# Patient Record
Sex: MALE | Race: WHITE | Employment: OTHER | URBAN - METROPOLITAN AREA
[De-identification: names, ages, dates, MRNs, and addresses within clinical notes are randomized per-mention and may not be internally consistent; named-entity substitution may affect disease eponyms.]

---

## 2017-02-09 ENCOUNTER — HOSPITAL ENCOUNTER (OUTPATIENT)
Dept: SURGERY | Age: 72
Discharge: HOME | End: 2017-02-09
Payer: MEDICARE

## 2017-02-09 ENCOUNTER — HOSPITAL ENCOUNTER (OUTPATIENT)
Dept: GENERAL RADIOLOGY | Age: 72
Discharge: HOME | End: 2017-02-09
Attending: UROLOGY
Payer: MEDICARE

## 2017-02-09 VITALS
WEIGHT: 170 LBS | DIASTOLIC BLOOD PRESSURE: 78 MMHG | HEIGHT: 66 IN | SYSTOLIC BLOOD PRESSURE: 159 MMHG | TEMPERATURE: 97.9 F | OXYGEN SATURATION: 92 % | BODY MASS INDEX: 27.32 KG/M2 | HEART RATE: 92 BPM | RESPIRATION RATE: 18 BRPM

## 2017-02-09 LAB
ANION GAP BLD CALC-SCNC: 8 MMOL/L (ref 7–16)
ATRIAL RATE: 68 BPM
BUN SERPL-MCNC: 13 MG/DL (ref 8–23)
CALCIUM SERPL-MCNC: 8.6 MG/DL (ref 8.3–10.4)
CALCULATED P AXIS, ECG09: 83 DEGREES
CALCULATED R AXIS, ECG10: 57 DEGREES
CALCULATED T AXIS, ECG11: 67 DEGREES
CHLORIDE SERPL-SCNC: 103 MMOL/L (ref 98–107)
CO2 SERPL-SCNC: 32 MMOL/L (ref 21–32)
CREAT SERPL-MCNC: 1.12 MG/DL (ref 0.8–1.5)
DIAGNOSIS, 93000: NORMAL
DIASTOLIC BP, ECG02: NORMAL MMHG
ERYTHROCYTE [DISTWIDTH] IN BLOOD BY AUTOMATED COUNT: 14.3 % (ref 11.9–14.6)
GLUCOSE SERPL-MCNC: 99 MG/DL (ref 65–100)
HCT VFR BLD AUTO: 43.1 % (ref 41.1–50.3)
HGB BLD-MCNC: 15 G/DL (ref 13.6–17.2)
MCH RBC QN AUTO: 31.3 PG (ref 26.1–32.9)
MCHC RBC AUTO-ENTMCNC: 34.8 G/DL (ref 31.4–35)
MCV RBC AUTO: 89.8 FL (ref 79.6–97.8)
P-R INTERVAL, ECG05: 118 MS
PLATELET # BLD AUTO: 137 K/UL (ref 150–450)
PMV BLD AUTO: 11.4 FL (ref 10.8–14.1)
POTASSIUM SERPL-SCNC: 4 MMOL/L (ref 3.5–5.1)
Q-T INTERVAL, ECG07: 404 MS
QRS DURATION, ECG06: 76 MS
QTC CALCULATION (BEZET), ECG08: 429 MS
RBC # BLD AUTO: 4.8 M/UL (ref 4.23–5.67)
SODIUM SERPL-SCNC: 143 MMOL/L (ref 136–145)
SYSTOLIC BP, ECG01: NORMAL MMHG
VENTRICULAR RATE, ECG03: 68 BPM
WBC # BLD AUTO: 6.4 K/UL (ref 4.3–11.1)

## 2017-02-09 PROCEDURE — 93005 ELECTROCARDIOGRAM TRACING: CPT | Performed by: UROLOGY

## 2017-02-09 PROCEDURE — 80048 BASIC METABOLIC PNL TOTAL CA: CPT | Performed by: UROLOGY

## 2017-02-09 PROCEDURE — 71020 XR CHEST PA LAT: CPT

## 2017-02-09 PROCEDURE — 85027 COMPLETE CBC AUTOMATED: CPT | Performed by: UROLOGY

## 2017-02-09 RX ORDER — TAMSULOSIN HYDROCHLORIDE 0.4 MG/1
0.4 CAPSULE ORAL
COMMUNITY
End: 2017-02-17

## 2017-02-09 RX ORDER — MEMANTINE HYDROCHLORIDE 10 MG/1
10 TABLET ORAL 2 TIMES DAILY
COMMUNITY

## 2017-02-09 RX ORDER — TRAZODONE HYDROCHLORIDE 50 MG/1
TABLET ORAL
COMMUNITY

## 2017-02-09 RX ORDER — SERTRALINE HYDROCHLORIDE 25 MG/1
25 TABLET, FILM COATED ORAL DAILY
COMMUNITY

## 2017-02-09 RX ORDER — ASPIRIN 81 MG/1
81 TABLET ORAL
COMMUNITY
End: 2017-02-17

## 2017-02-09 RX ORDER — DIVALPROEX SODIUM 250 MG/1
500 TABLET, DELAYED RELEASE ORAL 2 TIMES DAILY
COMMUNITY

## 2017-02-09 NOTE — PERIOP NOTES
Patient verified name, , and surgery as listed in [facility]. TYPE  CASE:2  Orders per surgeon: cbc,bmp,chest-xray Received  Labs per surgeon:yes: results -  Labs per anesthesia protocol: type and screen- dos : results -  EKG  :  yes      Patient provided with handouts including guide to surgery , transfusions, pain management and hand hygiene for the family and community. Pt verbalizes understanding of all pre-op instructions . Instructed that family must be present in building at all times. Hibiclens and instructions given per hospital policy. Instructed patient to continue  previous medications as prescribed prior to surgery and  to take the following medications the day of surgery according to anesthesia guidelines : depakote,namenda       Original medication prescription bottles some visualized during patient appointment. Continue all previous medications unless otherwise directed. Instructed patient to hold  the following medications prior to surgery: aspirin      Patient verbalized understanding of all instructions and provided all medical/health information to the best of their ability.

## 2017-02-09 NOTE — PERIOP NOTES
Dr. Donna Barr informed of pt period of confusion after general anesthesia, also implanted cardiac monitor- ok to proceed with surgery

## 2017-02-13 ENCOUNTER — ANESTHESIA EVENT (OUTPATIENT)
Dept: SURGERY | Age: 72
DRG: 709 | End: 2017-02-13
Payer: MEDICARE

## 2017-02-13 RX ORDER — SODIUM CHLORIDE 9 MG/ML
25 INJECTION, SOLUTION INTRAVENOUS CONTINUOUS
Status: CANCELLED | OUTPATIENT
Start: 2017-02-13 | End: 2017-02-14

## 2017-02-14 ENCOUNTER — ANESTHESIA (OUTPATIENT)
Dept: SURGERY | Age: 72
DRG: 709 | End: 2017-02-14
Payer: MEDICARE

## 2017-02-14 ENCOUNTER — SURGERY (OUTPATIENT)
Age: 72
End: 2017-02-14

## 2017-02-14 ENCOUNTER — HOSPITAL ENCOUNTER (INPATIENT)
Age: 72
LOS: 1 days | Discharge: HOME | DRG: 709 | End: 2017-02-17
Attending: UROLOGY | Admitting: UROLOGY
Payer: MEDICARE

## 2017-02-14 DIAGNOSIS — Z90.79 S/P TURP: ICD-10-CM

## 2017-02-14 DIAGNOSIS — R91.1 PULMONARY NODULE: Primary | ICD-10-CM

## 2017-02-14 DIAGNOSIS — R31.9 HEMATURIA: ICD-10-CM

## 2017-02-14 DIAGNOSIS — R09.02 HYPOXIA: ICD-10-CM

## 2017-02-14 DIAGNOSIS — J43.9 PULMONARY EMPHYSEMA, UNSPECIFIED EMPHYSEMA TYPE (HCC): Chronic | ICD-10-CM

## 2017-02-14 DIAGNOSIS — N13.9 OBSTRUCTIVE UROPATHY: ICD-10-CM

## 2017-02-14 LAB
ABO + RH BLD: NORMAL
BLOOD GROUP ANTIBODIES SERPL: NORMAL
SPECIMEN EXP DATE BLD: NORMAL

## 2017-02-14 PROCEDURE — 77030031139 HC SUT VCRL2 J&J -A: Performed by: UROLOGY

## 2017-02-14 PROCEDURE — 77030032490 HC SLV COMPR SCD KNE COVD -B

## 2017-02-14 PROCEDURE — 74011250636 HC RX REV CODE- 250/636

## 2017-02-14 PROCEDURE — 74011000258 HC RX REV CODE- 258: Performed by: UROLOGY

## 2017-02-14 PROCEDURE — 77030005546 HC CATH URETH FOL 3W BARD -A: Performed by: UROLOGY

## 2017-02-14 PROCEDURE — 77030032490 HC SLV COMPR SCD KNE COVD -B: Performed by: UROLOGY

## 2017-02-14 PROCEDURE — 74011250636 HC RX REV CODE- 250/636: Performed by: UROLOGY

## 2017-02-14 PROCEDURE — 88344 IMHCHEM/IMCYTCHM EA MLT ANTB: CPT

## 2017-02-14 PROCEDURE — 74011250637 HC RX REV CODE- 250/637: Performed by: UROLOGY

## 2017-02-14 PROCEDURE — 77030019927 HC TBNG IRR CYSTO BAXT -A: Performed by: UROLOGY

## 2017-02-14 PROCEDURE — 77030018836 HC SOL IRR NACL ICUM -A

## 2017-02-14 PROCEDURE — 77030018836 HC SOL IRR NACL ICUM -A: Performed by: UROLOGY

## 2017-02-14 PROCEDURE — 0TND8ZZ RELEASE URETHRA, VIA NATURAL OR ARTIFICIAL OPENING ENDOSCOPIC: ICD-10-PCS | Performed by: UROLOGY

## 2017-02-14 PROCEDURE — 77030002888 HC SUT CHRMC J&J -A: Performed by: UROLOGY

## 2017-02-14 PROCEDURE — 73060999999 HC MISC LAB CHARGE

## 2017-02-14 PROCEDURE — 74011000250 HC RX REV CODE- 250

## 2017-02-14 PROCEDURE — 88305 TISSUE EXAM BY PATHOLOGIST: CPT | Performed by: UROLOGY

## 2017-02-14 PROCEDURE — 86900 BLOOD TYPING SEROLOGIC ABO: CPT | Performed by: ANESTHESIOLOGY

## 2017-02-14 PROCEDURE — 76060000037 HC ANESTHESIA 3 TO 3.5 HR: Performed by: UROLOGY

## 2017-02-14 PROCEDURE — 76210000001 HC OR PH I REC 2.5 TO 3 HR: Performed by: UROLOGY

## 2017-02-14 PROCEDURE — 74011250637 HC RX REV CODE- 250/637: Performed by: ANESTHESIOLOGY

## 2017-02-14 PROCEDURE — 0VT08ZZ RESECTION OF PROSTATE, VIA NATURAL OR ARTIFICIAL OPENING ENDOSCOPIC: ICD-10-PCS | Performed by: UROLOGY

## 2017-02-14 PROCEDURE — 77030010545: Performed by: UROLOGY

## 2017-02-14 PROCEDURE — 77030020143 HC AIRWY LARYN INTUB CGAS -A: Performed by: ANESTHESIOLOGY

## 2017-02-14 PROCEDURE — 74011250636 HC RX REV CODE- 250/636: Performed by: ANESTHESIOLOGY

## 2017-02-14 PROCEDURE — 76010000133 HC OR TIME 3 TO 3.5 HR: Performed by: UROLOGY

## 2017-02-14 PROCEDURE — 77030029290 HC ELECTRD LP CUT OCOA -F: Performed by: UROLOGY

## 2017-02-14 PROCEDURE — 77030019940 HC BLNKT HYPOTHRM STRY -B: Performed by: ANESTHESIOLOGY

## 2017-02-14 RX ORDER — MIDAZOLAM HYDROCHLORIDE 1 MG/ML
2 INJECTION, SOLUTION INTRAMUSCULAR; INTRAVENOUS
Status: DISCONTINUED | OUTPATIENT
Start: 2017-02-14 | End: 2017-02-14 | Stop reason: HOSPADM

## 2017-02-14 RX ORDER — ATROPA BELLADONNA AND OPIUM 16.2; 6 MG/1; MG/1
SUPPOSITORY RECTAL AS NEEDED
Status: DISCONTINUED | OUTPATIENT
Start: 2017-02-14 | End: 2017-02-14 | Stop reason: HOSPADM

## 2017-02-14 RX ORDER — SODIUM CHLORIDE 0.9 % (FLUSH) 0.9 %
5-10 SYRINGE (ML) INJECTION AS NEEDED
Status: DISCONTINUED | OUTPATIENT
Start: 2017-02-14 | End: 2017-02-15

## 2017-02-14 RX ORDER — SODIUM CHLORIDE 0.9 % (FLUSH) 0.9 %
5-10 SYRINGE (ML) INJECTION AS NEEDED
Status: DISCONTINUED | OUTPATIENT
Start: 2017-02-14 | End: 2017-02-17 | Stop reason: HOSPADM

## 2017-02-14 RX ORDER — PROPOFOL 10 MG/ML
INJECTION, EMULSION INTRAVENOUS
Status: DISCONTINUED | OUTPATIENT
Start: 2017-02-14 | End: 2017-02-14 | Stop reason: HOSPADM

## 2017-02-14 RX ORDER — PROPOFOL 10 MG/ML
INJECTION, EMULSION INTRAVENOUS AS NEEDED
Status: DISCONTINUED | OUTPATIENT
Start: 2017-02-14 | End: 2017-02-14 | Stop reason: HOSPADM

## 2017-02-14 RX ORDER — LIDOCAINE HYDROCHLORIDE 20 MG/ML
INJECTION, SOLUTION EPIDURAL; INFILTRATION; INTRACAUDAL; PERINEURAL AS NEEDED
Status: DISCONTINUED | OUTPATIENT
Start: 2017-02-14 | End: 2017-02-14 | Stop reason: HOSPADM

## 2017-02-14 RX ORDER — SODIUM CHLORIDE 0.9 % (FLUSH) 0.9 %
5-10 SYRINGE (ML) INJECTION AS NEEDED
Status: DISCONTINUED | OUTPATIENT
Start: 2017-02-14 | End: 2017-02-14 | Stop reason: HOSPADM

## 2017-02-14 RX ORDER — HYDROCODONE BITARTRATE AND ACETAMINOPHEN 7.5; 325 MG/1; MG/1
1 TABLET ORAL AS NEEDED
Status: DISCONTINUED | OUTPATIENT
Start: 2017-02-14 | End: 2017-02-17 | Stop reason: HOSPADM

## 2017-02-14 RX ORDER — DIVALPROEX SODIUM 125 MG/1
250 TABLET, DELAYED RELEASE ORAL 2 TIMES DAILY
Status: DISCONTINUED | OUTPATIENT
Start: 2017-02-14 | End: 2017-02-17 | Stop reason: HOSPADM

## 2017-02-14 RX ORDER — SODIUM CHLORIDE 0.9 % (FLUSH) 0.9 %
5-10 SYRINGE (ML) INJECTION EVERY 8 HOURS
Status: DISCONTINUED | OUTPATIENT
Start: 2017-02-14 | End: 2017-02-14 | Stop reason: HOSPADM

## 2017-02-14 RX ORDER — SODIUM CHLORIDE 0.9 % (FLUSH) 0.9 %
5-10 SYRINGE (ML) INJECTION EVERY 8 HOURS
Status: DISCONTINUED | OUTPATIENT
Start: 2017-02-14 | End: 2017-02-17 | Stop reason: HOSPADM

## 2017-02-14 RX ORDER — TRAZODONE HYDROCHLORIDE 50 MG/1
50 TABLET ORAL
Status: DISCONTINUED | OUTPATIENT
Start: 2017-02-14 | End: 2017-02-17 | Stop reason: HOSPADM

## 2017-02-14 RX ORDER — PHENAZOPYRIDINE HYDROCHLORIDE 200 MG/1
200 TABLET, FILM COATED ORAL ONCE
Status: COMPLETED | OUTPATIENT
Start: 2017-02-14 | End: 2017-02-14

## 2017-02-14 RX ORDER — SODIUM CHLORIDE, SODIUM LACTATE, POTASSIUM CHLORIDE, CALCIUM CHLORIDE 600; 310; 30; 20 MG/100ML; MG/100ML; MG/100ML; MG/100ML
100 INJECTION, SOLUTION INTRAVENOUS CONTINUOUS
Status: DISCONTINUED | OUTPATIENT
Start: 2017-02-14 | End: 2017-02-14 | Stop reason: HOSPADM

## 2017-02-14 RX ORDER — LIDOCAINE HYDROCHLORIDE 10 MG/ML
0.1 INJECTION INFILTRATION; PERINEURAL AS NEEDED
Status: DISCONTINUED | OUTPATIENT
Start: 2017-02-14 | End: 2017-02-14 | Stop reason: HOSPADM

## 2017-02-14 RX ORDER — FENTANYL CITRATE 50 UG/ML
INJECTION, SOLUTION INTRAMUSCULAR; INTRAVENOUS AS NEEDED
Status: DISCONTINUED | OUTPATIENT
Start: 2017-02-14 | End: 2017-02-14 | Stop reason: HOSPADM

## 2017-02-14 RX ORDER — NALOXONE HYDROCHLORIDE 0.4 MG/ML
0.1 INJECTION, SOLUTION INTRAMUSCULAR; INTRAVENOUS; SUBCUTANEOUS AS NEEDED
Status: DISCONTINUED | OUTPATIENT
Start: 2017-02-14 | End: 2017-02-17 | Stop reason: HOSPADM

## 2017-02-14 RX ORDER — SODIUM CHLORIDE 9 MG/ML
75 INJECTION, SOLUTION INTRAVENOUS CONTINUOUS
Status: DISCONTINUED | OUTPATIENT
Start: 2017-02-14 | End: 2017-02-17

## 2017-02-14 RX ORDER — ATROPA BELLADONNA AND OPIUM 16.2; 6 MG/1; MG/1
1 SUPPOSITORY RECTAL
Status: DISCONTINUED | OUTPATIENT
Start: 2017-02-14 | End: 2017-02-17 | Stop reason: HOSPADM

## 2017-02-14 RX ORDER — HYDROMORPHONE HYDROCHLORIDE 2 MG/ML
0.5 INJECTION, SOLUTION INTRAMUSCULAR; INTRAVENOUS; SUBCUTANEOUS
Status: DISCONTINUED | OUTPATIENT
Start: 2017-02-14 | End: 2017-02-15 | Stop reason: HOSPADM

## 2017-02-14 RX ORDER — SERTRALINE HYDROCHLORIDE 50 MG/1
25 TABLET, FILM COATED ORAL DAILY
Status: DISCONTINUED | OUTPATIENT
Start: 2017-02-15 | End: 2017-02-17 | Stop reason: HOSPADM

## 2017-02-14 RX ORDER — MEMANTINE HYDROCHLORIDE 5 MG/1
10 TABLET ORAL 2 TIMES DAILY
Status: DISCONTINUED | OUTPATIENT
Start: 2017-02-14 | End: 2017-02-17 | Stop reason: HOSPADM

## 2017-02-14 RX ORDER — FAMOTIDINE 20 MG/1
20 TABLET, FILM COATED ORAL ONCE
Status: COMPLETED | OUTPATIENT
Start: 2017-02-14 | End: 2017-02-14

## 2017-02-14 RX ORDER — DEXAMETHASONE SODIUM PHOSPHATE 4 MG/ML
INJECTION, SOLUTION INTRA-ARTICULAR; INTRALESIONAL; INTRAMUSCULAR; INTRAVENOUS; SOFT TISSUE AS NEEDED
Status: DISCONTINUED | OUTPATIENT
Start: 2017-02-14 | End: 2017-02-14 | Stop reason: HOSPADM

## 2017-02-14 RX ORDER — ONDANSETRON 2 MG/ML
INJECTION INTRAMUSCULAR; INTRAVENOUS AS NEEDED
Status: DISCONTINUED | OUTPATIENT
Start: 2017-02-14 | End: 2017-02-14 | Stop reason: HOSPADM

## 2017-02-14 RX ORDER — FENTANYL CITRATE 50 UG/ML
100 INJECTION, SOLUTION INTRAMUSCULAR; INTRAVENOUS ONCE
Status: DISCONTINUED | OUTPATIENT
Start: 2017-02-14 | End: 2017-02-14 | Stop reason: HOSPADM

## 2017-02-14 RX ORDER — OXYCODONE HYDROCHLORIDE 5 MG/1
5 TABLET ORAL
Status: DISCONTINUED | OUTPATIENT
Start: 2017-02-14 | End: 2017-02-17 | Stop reason: HOSPADM

## 2017-02-14 RX ADMIN — ATROPA BELLADONNA AND OPIUM 1 SUPPOSITORY: 16.2; 6 SUPPOSITORY RECTAL at 13:06

## 2017-02-14 RX ADMIN — FENTANYL CITRATE 50 MCG: 50 INJECTION, SOLUTION INTRAMUSCULAR; INTRAVENOUS at 10:26

## 2017-02-14 RX ADMIN — FENTANYL CITRATE 50 MCG: 50 INJECTION, SOLUTION INTRAMUSCULAR; INTRAVENOUS at 11:08

## 2017-02-14 RX ADMIN — CEFTRIAXONE 2 G: 2 INJECTION, POWDER, FOR SOLUTION INTRAMUSCULAR; INTRAVENOUS at 08:45

## 2017-02-14 RX ADMIN — SODIUM CHLORIDE, SODIUM LACTATE, POTASSIUM CHLORIDE, AND CALCIUM CHLORIDE 100 ML/HR: 600; 310; 30; 20 INJECTION, SOLUTION INTRAVENOUS at 09:00

## 2017-02-14 RX ADMIN — DIVALPROEX SODIUM 250 MG: 125 TABLET, DELAYED RELEASE ORAL at 17:02

## 2017-02-14 RX ADMIN — SODIUM CHLORIDE 75 ML/HR: 900 INJECTION, SOLUTION INTRAVENOUS at 17:07

## 2017-02-14 RX ADMIN — HYDROCODONE BITARTRATE AND ACETAMINOPHEN 1 TABLET: 7.5; 325 TABLET ORAL at 17:01

## 2017-02-14 RX ADMIN — PROPOFOL 10 MG: 10 INJECTION, EMULSION INTRAVENOUS at 12:36

## 2017-02-14 RX ADMIN — PROPOFOL 200 MCG/KG/MIN: 10 INJECTION, EMULSION INTRAVENOUS at 10:20

## 2017-02-14 RX ADMIN — DEXAMETHASONE SODIUM PHOSPHATE 4 MG: 4 INJECTION, SOLUTION INTRA-ARTICULAR; INTRALESIONAL; INTRAMUSCULAR; INTRAVENOUS; SOFT TISSUE at 10:59

## 2017-02-14 RX ADMIN — PHENAZOPYRIDINE 200 MG: 200 TABLET ORAL at 14:29

## 2017-02-14 RX ADMIN — Medication 10 ML: at 22:00

## 2017-02-14 RX ADMIN — FAMOTIDINE 20 MG: 20 TABLET, FILM COATED ORAL at 09:00

## 2017-02-14 RX ADMIN — GENTAMICIN SULFATE 160 MG: 40 INJECTION, SOLUTION INTRAMUSCULAR; INTRAVENOUS at 10:13

## 2017-02-14 RX ADMIN — PROPOFOL 150 MG: 10 INJECTION, EMULSION INTRAVENOUS at 10:18

## 2017-02-14 RX ADMIN — MEMANTINE HYDROCHLORIDE 10 MG: 5 TABLET ORAL at 17:02

## 2017-02-14 RX ADMIN — PROPOFOL 10 MG: 10 INJECTION, EMULSION INTRAVENOUS at 13:01

## 2017-02-14 RX ADMIN — SODIUM CHLORIDE, SODIUM LACTATE, POTASSIUM CHLORIDE, AND CALCIUM CHLORIDE: 600; 310; 30; 20 INJECTION, SOLUTION INTRAVENOUS at 13:14

## 2017-02-14 RX ADMIN — ONDANSETRON 4 MG: 2 INJECTION INTRAMUSCULAR; INTRAVENOUS at 10:59

## 2017-02-14 RX ADMIN — LIDOCAINE HYDROCHLORIDE 60 MG: 20 INJECTION, SOLUTION EPIDURAL; INFILTRATION; INTRACAUDAL; PERINEURAL at 10:18

## 2017-02-14 RX ADMIN — Medication 10 ML: at 17:00

## 2017-02-14 NOTE — ANESTHESIA PREPROCEDURE EVALUATION
Anesthetic History          Comments: Postoperative confusion     Review of Systems / Medical History  Patient summary reviewed    Pulmonary    COPD: mild      Smoker (Former)         Neuro/Psych         TIA     Cardiovascular                  Exercise tolerance: >4 METS     GI/Hepatic/Renal                Endo/Other             Other Findings              Physical Exam    Airway  Mallampati: II  TM Distance: > 6 cm  Neck ROM: normal range of motion   Mouth opening: Normal     Cardiovascular  Regular rate and rhythm,  S1 and S2 normal,  no murmur, click, rub, or gallop             Dental  No notable dental hx       Pulmonary  Breath sounds clear to auscultation               Abdominal         Other Findings            Anesthetic Plan    ASA: 3  Anesthesia type: general            Anesthetic plan and risks discussed with: Patient

## 2017-02-14 NOTE — PERIOP NOTES
Pt BP 87/45. Dr Chris Lofton at bedside. New orders received for LR bolus. Reassess at 1523 /47 after 300ml LR.

## 2017-02-14 NOTE — ANESTHESIA POSTPROCEDURE EVALUATION
Post-Anesthesia Evaluation and Assessment    Patient: Srinivas Colón MRN: [id number]  SSN: [id number]    YOB: 1945  Age: 67 y.o. Sex: male       Cardiovascular Function/Vital Signs  Visit Vitals    /50    Pulse 72    Temp 36.4 °C (97.6 °F)    Resp 12    SpO2 99%       Patient is status post general anesthesia for Procedure(s):  TRANSURETHRAL RESECTION OF PROSTATE WITH BIPOLAR, MEATOTOMY, INTERNAL URETHROTOMY. Nausea/Vomiting: None    Postoperative hydration reviewed and adequate. Pain:      Managed    Neurological Status:   Neuro (WDL): Exceptions to WDL (02/14/17 1333)   At baseline    Mental Status and Level of Consciousness: Arousable    Pulmonary Status:   O2 Device: Oral airway; Oxygen mask (02/14/17 1333)   Adequate oxygenation and airway patent    Complications related to anesthesia: None    Post-anesthesia assessment completed.  No concerns    Signed By: Tomi Lowery MD     February 14, 2017

## 2017-02-14 NOTE — PERIOP NOTES
TRANSFER - OUT REPORT:    Verbal report given to Halley(name) on David Dhillon  being transferred to [facility](unit) for routine post - op       Report consisted of patients Situation, Background, Assessment and   Recommendations(SBAR). Information from the following report(s) SBAR, OR Summary, Procedure Summary and MAR was reviewed with the receiving nurse. Lines:   Peripheral IV 02/14/17 Right Antecubital (Active)   Site Assessment Clean, dry, & intact 2/14/2017  1:28 PM   Phlebitis Assessment 0 2/14/2017  1:28 PM   Infiltration Assessment 0 2/14/2017  1:28 PM   Dressing Status Clean, dry, & intact 2/14/2017  1:28 PM   Dressing Type Transparent 2/14/2017  1:28 PM   Hub Color/Line Status Pink; Infusing 2/14/2017  1:28 PM        Opportunity for questions and clarification was provided. Patient transported with:   O2 @ 2 liters    VTE prophylaxis orders have not been written for David Dhillon. Patient given room number and nurses name. Family updated re: pt status after security code verified.

## 2017-02-14 NOTE — BRIEF OP NOTE
BRIEF OPERATIVE NOTE    Date of Procedure: 2/14/2017   Preoperative Diagnosis: Hematuria [R31.9]  Postoperative Diagnosis: Enlarged Prostate, Urethral stricture    Procedure(s):  TRANSURETHRAL RESECTION OF PROSTATE WITH BIPOLAR, MEATOPLASTY, INTERNAL URETHROTOMY  Surgeon(s) and Role:     * Aurelio Burgos MD - Primary            Surgical Staff:  Circ-1: Baron Oates RN  Circ-Relief: Luke Situ. Latha Douglas RN  Event Time In   Incision Start 1047   Incision Close 1314     Anesthesia: General   Estimated Blood Loss: >2 units  Specimens:   ID Type Source Tests Collected by Time Destination   1 : PROSTATE Preservative Prostate  Aurelio Burgos MD 2/14/2017 1200 Pathology      Findings: very large and vascular prostate. Complications: excess bleeding, pt on asa 81 mg.   Implants: * No implants in log *

## 2017-02-14 NOTE — IP AVS SNAPSHOT
[address] 
[phone] Patient: Angelica Muñoz MRN: [id number] NFB:5/46/[id number] You are allergic to the following Allergen Reactions Pcn (Penicillins) Other (comments) Recent Documentation Height Weight BMI Smoking Status 1.676 m 77.1 kg 27.44 kg/m2 Former Smoker Unresulted Labs Order Current Status CULTURE, URINE Preliminary result Emergency Contacts Name Discharge Info Relation Home Work Mobile ElianNissa DISCHARGE CAREGIVER [3] Spouse [3] [phone] About your hospitalization You were admitted on:  February 14, 2017 You last received care in the:  [facility] 6 MED SURG You were discharged on:  February 17, 2017 Unit phone number:  [phone] Why you were hospitalized Your primary diagnosis was:  Obstructive Uropathy Your diagnoses also included:  Prostate Enlargement, Hematuria, S/P Turp, Bph (Benign Prostatic Hyperplasia), Hypoxia, Pulmonary Nodule, History Of Tobacco Use, Copd (Chronic Obstructive Pulmonary Disease) ([facility]) Providers Seen During Your Hospitalizations Provider Role Specialty Primary office phone Anita Ashley MD Attending Provider Urology [phone] Your Primary Care Physician (PCP) Primary Care Physician Office Phone Office Fax NOT ON FILE ** None ** ** None ** Follow-up Information Follow up With Details Comments Contact Info Not On File Bshsi   Not On File (62) Patient has a PCP but that physician is not listed in [address] [facility]. Anita Ashley MD On 3/2/2017 at 3:30 [address] [facility] [place] [id number] 
[phone] Current Discharge Medication List  
  
START taking these medications Dose & Instructions Dispensing Information Comments Morning Noon Evening Bedtime  
 albuterol sulfate 90 mcg/actuation Aepb Commonly known as:  Jazmyn Daley Dose:  2 Puff Take 2 Puffs by inhalation every six (6) hours as needed. Quantity:  1 Inhaler Refills:  1 CONTINUE these medications which have NOT CHANGED Dose & Instructions Dispensing Information Comments Morning Noon Evening Bedtime  
 divalproex  mg tablet Commonly known as:  DEPAKOTE Your next dose is: Today Dose:  250 mg Take 250 mg by mouth two (2) times a day. Refills:  0  
     
   
   
  
   
  
 NAMENDA 10 mg tablet Generic drug:  memantine Your next dose is: Today Dose:  10 mg Take 10 mg by mouth two (2) times a day. Refills:  0  
     
   
   
  
   
  
 traZODone 50 mg tablet Commonly known as:  Colin Wharton Your next dose is: Today Take  by mouth nightly. Refills:  0  
     
   
   
   
  
  
 ZOLOFT 25 mg tablet Generic drug:  sertraline Your next dose is:  Tomorrow Dose:  25 mg Take 25 mg by mouth daily. Refills:  0 STOP taking these medications   
 aspirin delayed-release 81 mg tablet FLOMAX 0.4 mg capsule Generic drug:  tamsulosin Where to Get Your Medications Information on where to get these meds will be given to you by the nurse or doctor. ! Ask your nurse or doctor about these medications  
  albuterol sulfate 90 mcg/actuation Aepb Discharge Instructions DISCHARGE SUMMARY from Nurse The following personal items are in your possession at time of discharge: 
 
  
Visual Aid: Glasses Clothing: At bedside PATIENT INSTRUCTIONS: 
 
 
F-face looks uneven A-arms unable to move or move unevenly S-speech slurred or non-existent T-time-call 911 as soon as signs and symptoms begin-DO NOT go Back to bed or wait to see if you get better-TIME IS BRAIN. Warning Signs of HEART ATTACK Call 911 if you have these symptoms: 
? Chest discomfort. Most heart attacks involve discomfort in the center of the chest that lasts more than a few minutes, or that goes away and comes back. It can feel like uncomfortable pressure, squeezing, fullness, or pain. ? Discomfort in other areas of the upper body. Symptoms can include pain or discomfort in one or both arms, the back, neck, jaw, or stomach. ? Shortness of breath with or without chest discomfort. ? Other signs may include breaking out in a cold sweat, nausea, or lightheadedness. Don't wait more than five minutes to call [address]! Fast action can save your life. Calling 911 is almost always the fastest way to get lifesaving treatment. Emergency Medical Services staff can begin treatment when they arrive  up to an hour sooner than if someone gets to the hospital by car. The discharge information has been reviewed with the patient. The patient verbalized understanding. Discharge medications reviewed with the patient and appropriate educational materials and side effects teaching were provided. Discharge Instructions Attachments/References TURP (TRANSURETHRAL RESECTION OF THE PROSTATE): POST-OP (ENGLISH) Discharge Orders None [facility] Announcement We are excited to announce that we are making your provider's discharge notes available to you in [employer]. You will see these notes when they are completed and signed by the physician that discharged you from your recent hospital stay.   If you have any questions or concerns about any information you see in [employer], please call the [facility] Go Department where you were seen or reach out to your Primary Care Provider for more information about your plan of care. Introducing \[facility] [place] [facility]\"" & HEALTH SERVICES! Don Goodman introduces [employer] patient portal. Now you can access parts of your medical record, email your doctor's office, and request medication refills online. 1. In your internet browser, go to [email or web address] 2. Click on the First Time User? Click Here link in the Sign In box. You will see the New Member Sign Up page. 3. Enter your [employer] Access Code exactly as it appears below. You will not need to use this code after youve completed the sign-up process. If you do not sign up before the expiration date, you must request a new code. · [employer] Access Code: [email or web address] Expires: 5/4/2017 12:54 PM 
 
4. Enter the last four digits of your Social Security Number (xxxx) and Date of Birth (mm/dd/yyyy) as indicated and click Submit. You will be taken to the next sign-up page. 5. Create a [employer] ID. This will be your [employer] login ID and cannot be changed, so think of one that is secure and easy to remember. 6. Create a [employer] password. You can change your password at any time. 7. Enter your Password Reset Question and Answer. This can be used at a later time if you forget your password. 8. Enter your e-mail address. You will receive e-mail notification when new information is available in [address] 9. Click Sign Up. You can now view and download portions of your medical record. 10. Click the Download Summary menu link to download a portable copy of your medical information. If you have questions, please visit the Frequently Asked Questions section of the [employer] website. Remember, [employer] is NOT to be used for urgent needs. For medical emergencies, dial 911. Now available from your iPhone and Android! General Information Please provide this summary of care documentation to your next provider. Patient Signature:  ____________________________________________________________ Date:  ____________________________________________________________  
  
Brad Moulding Provider Signature:  ____________________________________________________________ Date:  ____________________________________________________________ More Information Transurethral Resection of the Prostate (TURP): What to Expect at [facility] Your Recovery Transurethral resection of the prostate (TURP) is surgery to remove a section of the prostate gland. This is done when the prostate gland has grown too large. The prostate gland is a walnut-sized organ in men that grows around the urethra. The urethra is the tube that carries urine from the bladder, through the penis, to outside the body. The prostate gland produces most of the fluid in semen. You may need a urinary catheter for a short time. A urinary catheter is a flexible plastic tube used to drain urine from your bladder when you cannot urinate on your own. If it is still in place when you go home, your doctor will give you instructions on how to care for your catheter. For several days after surgery, you may feel burning when you urinate. Your urine may be pink for 1 to 3 weeks after surgery. You also may have bladder cramps, or spasms. Your doctor may give you medicine to help control the spasms. You may still feel like you need to urinate often in the weeks after your surgery. It often takes up to 6 weeks for this to get better. Once you have healed, you may have less trouble urinating. You may have better control over starting and stopping your urine stream and feel like you get more relief when you urinate. Most men can return to work or many of their usual activities in 1 to 3 weeks.  But for about 6 weeks, try to avoid heavy lifting and strenuous activities that might put extra pressure on your bladder. Most men still can have erections after surgery (if they were able to have them before surgery), but they may not ejaculate when they have an orgasm. Semen may go into the bladder instead of out through the penis. This is called retrograde ejaculation. This does not hurt and is not harmful to your health. But it may mean that you will not be able to father a child. If this is a concern, talk to your doctor about saving your sperm before the surgery. This care sheet gives you a general idea about how long it will take for you to recover. But each person recovers at a different pace. Follow the steps below to get better as quickly as possible. How can you care for yourself at home? Activity · Rest when you feel tired. Getting enough sleep will help you recover. · Try to walk each day. Start by walking a little more than you did the day before. Bit by bit, increase the amount you walk. Walking boosts blood flow and helps prevent pneumonia and constipation. · Avoid strenuous activities for 6 weeks after surgery, or until your doctor says it is okay. This includes bicycle riding, jogging, weight lifting, or aerobic exercise. · For 6 weeks, avoid lifting anything that would make you strain. This may include a child, heavy grocery bags and milk containers, a heavy briefcase or backpack, cat litter or dog food bags, or a vacuum . · Ask your doctor when you can drive again. · You will probably need to take 1 to 3 weeks off from work. It depends on the type of work you do and how you feel. · Do not put anything in your rectum, such as an enema or suppository, for 4 to 6 weeks after the surgery. · You may shower and take baths when your doctor says it is okay. · Ask your doctor when it is okay for you to have sex. Diet · You can eat your normal diet.  If your stomach is upset, try bland, low-fat foods like plain rice, broiled chicken, toast, and yogurt. · Drink plenty of fluids (unless your doctor tells you not to). · You may notice that your bowel movements are not regular right after your surgery. This is common. Try to avoid constipation and straining with bowel movements. You may want to take a fiber supplement every day. If you have not had a bowel movement after a couple of days, ask your doctor about taking a mild laxative. Medicines · Your doctor will tell you if and when you can restart your medicines. He or she will also give you instructions about taking any new medicines. · If you take blood thinners, such as warfarin (Coumadin), clopidogrel (Plavix), or aspirin, be sure to talk to your doctor. He or she will tell you if and when to start taking those medicines again. Make sure that you understand exactly what your doctor wants you to do. · Be safe with medicines. Take pain medicines exactly as directed. ¨ If the doctor gave you a prescription medicine for pain, take it as prescribed. ¨ If you are not taking a prescription pain medicine, ask your doctor if you can take an over-the-counter medicine. · If you think your pain medicine is making you sick to your stomach: 
¨ Take your medicine after meals (unless your doctor has told you not to). ¨ Ask your doctor for a different pain medicine. · If your doctor prescribed antibiotics, take them as directed. Do not stop taking them just because you feel better. You need to take the full course of antibiotics. Follow-up care is a key part of your treatment and safety. Be sure to make and go to all appointments, and call your doctor if you are having problems. It's also a good idea to know your test results and keep a list of the medicines you take. When should you call for help? Call 911 anytime you think you may need emergency care. For example, call if: 
· You passed out (lost consciousness). · You have severe trouble breathing. · You have sudden chest pain and shortness of breath, or you cough up blood. Call your doctor now or seek immediate medical care if: 
· You cannot urinate. · You are leaking or dripping urine. · You have pain that does not get better after you take pain medicine. · You are sick to your stomach or cannot keep fluids down. · You have pain in your back just below your rib cage. This is called flank pain. · You have a fever, chills, or body aches. · You have signs of a blood clot, such as: 
¨ Pain in your calf, back of the knee, thigh, or groin. ¨ Redness and swelling in your leg or groin. Watch closely for changes in your health, and be sure to contact your doctor if: 
· You do not have a bowel movement after taking a laxative. Where can you learn more? Go to http://jenn-dulce.info/. Enter U474 in the search box to learn more about \"Transurethral Resection of the Prostate (TURP): What to Expect at Home. \" Current as of: May 24, 2016 Content Version: 11.1 © 2090-6050 [employer]. Care instructions adapted under license by [employer] (which disclaims liability or warranty for this information). If you have questions about a medical condition or this instruction, always ask your healthcare professional. [address] any warranty or liability for your use of this information.

## 2017-02-14 NOTE — PROGRESS NOTES
Primary Nurse Uriah Persaud, RN and Elenita Weaver RN , RN performed a dual skin assessment on this patient No impairment noted  Kavin score is 22

## 2017-02-14 NOTE — PERIOP NOTES
Margaret Simmons RN, assessed pt's abdomen/bladder before patient was taken to room. Abdomen is round, soft and non-tender with no distention. Per spouse, patient has history of pulling out harris catheter after procedure. Floor nurse made aware, transport aware. Transport here to take patient to 620.

## 2017-02-14 NOTE — PERIOP NOTES
Pt complaint of urinary urgency. Explained to patient and spouse regarding  Dodson catheter and CBI. Dr Britt De Leon aware. New orders received for pyridium. Dr Henrietta Devries made aware of order. See MAR.

## 2017-02-14 NOTE — PROGRESS NOTES
TRANSFER - IN REPORT:    Verbal report received from [address] (name) on Neena Ivy  being received from PACU (unit) for routine progression of care      Report consisted of patients Situation, Background, Assessment and   Recommendations(SBAR). Information from the following report(s) SBAR was reviewed with the receiving nurse. Opportunity for questions and clarification was provided. Assessment completed upon patients arrival to unit and care assumed.

## 2017-02-14 NOTE — H&P
[address]  ^ PHYSICAL       Name:  Elfreda Siemens   MR#:  [id number]   :  1945   Account #:  [de-identified]   Date of Adm:  2017       HISTORY OF PRESENT ILLNESS: Patient is an 69-year-old white male   who was initially seen in acute urinary retention. The patient was treated for his urinary retention with alpha blocker therapy and his urinary tract infection with   antibiotics. I was finally able to get the patient catheter free and   infection free with the use of the antibiotic and Flomax twice daily. The patient underwent an outpatient  evaluation for the hematuria, urinary tract infection, and prostate enlargement bladder outlet obstruction. He was found to have a rather large median lobe enlargement with significant outlet obstruction. After discussion with the wife and the patient, with findings at the outpatient surgery, they elected to proceed with transurethral bipolar resection of the prostate. ADDITIONAL PAST MEDICAL HISTORY, FAMILY HISTORY, REVIEW OF   SYSTEMS: See patient completed questionnaire which was reviewed with the patient. PHYSICAL EXAMINATION   GENERAL: Well-developed, well-nourished white male in no acute distress. VITAL SIGNS: Blood pressure 124/54, weight 170, pulse 64. NEUROPSYCHIATRIC: Patient is oriented x3. No obvious neurologic deficits present. CHEST: Patient does have a few rales and rhonchi in the chest. He some increased AP diameter and some decreased breath sounds bilaterally. No areas of consolidation are appreciated. Breath sounds are equal bilaterally even though decreased. ABDOMEN: Liver, spleen, kidneys and bladder are not palpable. No masses, tenderness or hernia. GENITALIA: Normal uncircumcised male. Scrotum and scrotal contents are unremarkable. Testes and epididymides normal to bilateral palpation. RECTAL: Sphincter tone, anal canal and rectal mucosa are unremarkable.  The prostate is enlarged to an estimated 40 grams. No nodularity or induration is present. EXTREMITIES: Pulses, strength and range of motion equal bilaterally in the upper and lower extremities. IMPRESSION    1. History of urinary retention. 2. History of urinary tract infection. 3. Prostate enlargement. PLAN: The patient is brought in at this time for bipolar   transurethral resection of the prostate.          MD Roxana Bella / Miky Zaragoza   D:  02/13/2017   18:46   T:  02/13/2017   19:00   Job #:  [id number]

## 2017-02-15 LAB
BASOPHILS # BLD AUTO: 0 K/UL (ref 0–0.2)
BASOPHILS # BLD: 0 % (ref 0–2)
DIFFERENTIAL METHOD BLD: ABNORMAL
EOSINOPHIL # BLD: 0 K/UL (ref 0–0.8)
EOSINOPHIL NFR BLD: 0 % (ref 0.5–7.8)
ERYTHROCYTE [DISTWIDTH] IN BLOOD BY AUTOMATED COUNT: 15 % (ref 11.9–14.6)
HCT VFR BLD AUTO: 28.9 % (ref 41.1–50.3)
HCT VFR BLD AUTO: 29.4 % (ref 41.1–50.3)
HGB BLD-MCNC: 10 G/DL (ref 13.6–17.2)
HGB BLD-MCNC: 9.5 G/DL (ref 13.6–17.2)
IMM GRANULOCYTES # BLD: 0 K/UL (ref 0–0.5)
IMM GRANULOCYTES NFR BLD AUTO: 0.1 % (ref 0–5)
LYMPHOCYTES # BLD AUTO: 9 % (ref 13–44)
LYMPHOCYTES # BLD: 1.3 K/UL (ref 0.5–4.6)
MCH RBC QN AUTO: 30.5 PG (ref 26.1–32.9)
MCHC RBC AUTO-ENTMCNC: 32.9 G/DL (ref 31.4–35)
MCV RBC AUTO: 92.9 FL (ref 79.6–97.8)
MONOCYTES # BLD: 1.6 K/UL (ref 0.1–1.3)
MONOCYTES NFR BLD AUTO: 11 % (ref 4–12)
NEUTS SEG # BLD: 11.3 K/UL (ref 1.7–8.2)
NEUTS SEG NFR BLD AUTO: 80 % (ref 43–78)
PLATELET # BLD AUTO: 128 K/UL (ref 150–450)
PMV BLD AUTO: 12 FL (ref 10.8–14.1)
RBC # BLD AUTO: 3.11 M/UL (ref 4.23–5.67)
WBC # BLD AUTO: 14.3 K/UL (ref 4.3–11.1)

## 2017-02-15 PROCEDURE — 77010033711 HC HIGH FLOW OXYGEN

## 2017-02-15 PROCEDURE — 74011250636 HC RX REV CODE- 250/636: Performed by: UROLOGY

## 2017-02-15 PROCEDURE — 77030027138 HC INCENT SPIROMETER -A

## 2017-02-15 PROCEDURE — 74011000258 HC RX REV CODE- 258: Performed by: UROLOGY

## 2017-02-15 PROCEDURE — 85025 COMPLETE CBC W/AUTO DIFF WBC: CPT | Performed by: UROLOGY

## 2017-02-15 PROCEDURE — 77030018836 HC SOL IRR NACL ICUM -A

## 2017-02-15 PROCEDURE — 94760 N-INVAS EAR/PLS OXIMETRY 1: CPT

## 2017-02-15 PROCEDURE — 36415 COLL VENOUS BLD VENIPUNCTURE: CPT | Performed by: UROLOGY

## 2017-02-15 PROCEDURE — 74011250637 HC RX REV CODE- 250/637: Performed by: UROLOGY

## 2017-02-15 PROCEDURE — 74011250637 HC RX REV CODE- 250/637: Performed by: ANESTHESIOLOGY

## 2017-02-15 PROCEDURE — 99218 HC RM OBSERVATION: CPT

## 2017-02-15 PROCEDURE — 85018 HEMOGLOBIN: CPT | Performed by: UROLOGY

## 2017-02-15 RX ORDER — IBUPROFEN 200 MG
200 TABLET ORAL ONCE
Status: COMPLETED | OUTPATIENT
Start: 2017-02-15 | End: 2017-02-15

## 2017-02-15 RX ADMIN — MEMANTINE HYDROCHLORIDE 10 MG: 5 TABLET ORAL at 17:01

## 2017-02-15 RX ADMIN — DIVALPROEX SODIUM 250 MG: 125 TABLET, DELAYED RELEASE ORAL at 17:01

## 2017-02-15 RX ADMIN — IBUPROFEN 200 MG: 200 TABLET, FILM COATED ORAL at 16:54

## 2017-02-15 RX ADMIN — SODIUM CHLORIDE 75 ML/HR: 900 INJECTION, SOLUTION INTRAVENOUS at 23:08

## 2017-02-15 RX ADMIN — SERTRALINE HYDROCHLORIDE 25 MG: 50 TABLET, FILM COATED ORAL at 09:23

## 2017-02-15 RX ADMIN — ATROPA BELLADONNA AND OPIUM 1 SUPPOSITORY: 16.2; 6 SUPPOSITORY RECTAL at 16:58

## 2017-02-15 RX ADMIN — HYDROCODONE BITARTRATE AND ACETAMINOPHEN 1 TABLET: 7.5; 325 TABLET ORAL at 18:05

## 2017-02-15 RX ADMIN — HYDROCODONE BITARTRATE AND ACETAMINOPHEN 1 TABLET: 7.5; 325 TABLET ORAL at 13:25

## 2017-02-15 RX ADMIN — CEFTRIAXONE 2 G: 2 INJECTION, POWDER, FOR SOLUTION INTRAMUSCULAR; INTRAVENOUS at 09:22

## 2017-02-15 RX ADMIN — TRAZODONE HYDROCHLORIDE 50 MG: 50 TABLET ORAL at 22:00

## 2017-02-15 RX ADMIN — TRAZODONE HYDROCHLORIDE 50 MG: 50 TABLET ORAL at 00:15

## 2017-02-15 RX ADMIN — DIVALPROEX SODIUM 250 MG: 125 TABLET, DELAYED RELEASE ORAL at 09:22

## 2017-02-15 RX ADMIN — MEMANTINE HYDROCHLORIDE 10 MG: 5 TABLET ORAL at 09:22

## 2017-02-15 RX ADMIN — ATROPA BELLADONNA AND OPIUM 1 SUPPOSITORY: 16.2; 6 SUPPOSITORY RECTAL at 00:15

## 2017-02-15 NOTE — PROGRESS NOTES
Pt has continuously tried to pull cathter out on this shift . Nurses and PCA's have tried to monitor pt by 1:1, reminding pt to leave cathter alone, wrapping catheter with kerlix and repeatedly putting the rubber band back ob his toe. Dr. Eh Tate notified and bilateral wrist restraints were ordered and put on. Wife was also notified. Will continue to monitor.

## 2017-02-15 NOTE — PROGRESS NOTES
Called Dr Edwards Skill regarding patient fever which was 99.2 and has now increased to 99.8 with a dose of Norco at 1330.  Per Dr Lindsey Reyes give a one time dose of ibuprofen 200 mg now and order a CBC with differential in the am.

## 2017-02-15 NOTE — PROGRESS NOTES
Care Management Interventions  PCP Verified by CM: Yes  Transition of Care Consult (CM Consult): Discharge Planning  Discharge Durable Medical Equipment: No  Physical Therapy Consult: No  Occupational Therapy Consult: No  Speech Therapy Consult: No  Current Support Network: Lives with Spouse  Confirm Follow Up Transport: Family  Plan discussed with Pt/Family/Caregiver: Yes  Freedom of Choice Offered: Yes  Discharge Location  Discharge Placement: Home with family assistance    SW met with patient to initiate D/C Planning, Patient is observation. SW provided patient the Guide to Observation and Outpatient Care and advised patient of Observation status. Copy of letter provided to patient and copy placed in chart. Pt lives w spouse/ Nia Gregorio in [place], [place]. Pt is uncertain why he is at [facility] and told me that his spouse makes all the decisions. Pt stated to be independent with ADL's prior to admission, denied use of any DME, stated to be driving at baseline. Patient's PCP is Dr. Khadijah Hector. Pt stated he has no issues affording medications. Pt denied any perceived needs at discharge and hopeful to go home tomorrow. SW will remain available through discharge.

## 2017-02-15 NOTE — OP NOTES
[facility] 64   OPERATIVE REPORT       Name:  Stefania Reid   MR#:  [id number]   :  1945   Account #:  [de-identified]   Date of Adm:  2017       PREOPERATIVE DIAGNOSIS: Urinary retention, urinary tract   infection, prostate enlargement, obstructive uropathy. POSTOPERATIVE DIAGNOSIS: Urinary retention, urinary tract   infection, prostate enlargement, obstructive uropathy, distal   urethral stricture disease. OPERATION: Internal urethrotomy meatoplasty, cystourethroscopy,   transurethral resection of the prostate. SURGEON: German Phillips. Reynold Poole MD.     ANESTHESIA: General.     COMPLICATION: Increased blood loss secondary to 81 mg aspirin   anticoagulant therapy. DESCRIPTION: The patient was on aspirin 81 mg until 4 days prior   to the procedure. With the patient placed in the lithotomy position, a sterile field   was prepped. An attempt was made to insert a 24 resectoscope   sheath and patient was found to have a submeatal stricture. Urethroscope was used to guide internal urethrotomy at the   6 o'clock position. The urethral mucosa was advanced over the   area of the scar tissue and approximated at the body of   the mucosa with interrupted stitches of 4-0 chromic suture. Urethroscopy was carried out which revealed trilobar enlargement of   the prostate with total occlusion of the prostate at the   urethral lumen. Inspection of the anterior bladder no stones,   ulcers, tumors, diverticula, or foreign bodies present. There   was heavy thickening throughout the bladder wall with multiple   small cellules. Routine resection of prostate was carried out down   to the prostatic capsule in all quadrants. As mentioned earlier,   the patient had a lot of bleeding due to the anticoagulant. Blood loss was estimated at greater than 2 units. Once the   resection was completed, the prostatic chips were irrigated from   the bladder.   Both ureteral orifices were visualized after irrigation of the chips from the bladder. The bladder was   drained and irrigated with a 24 triple lumen Dodson   catheter. Fifty mL of fluid was placed in the catheter balloon   due to the prostate size. The catheter was connected to rubber   band traction. The catheter was connected to straight drainage   and continuous irrigation. The procedure was terminated without complication. The patient will be admitted to the hospital for bladder   irrigation and bleeding control.          MD Shelly Alcocer / Romana   D:  02/14/2017   14:28   T:  02/14/2017   15:04   Job #:  [id number]

## 2017-02-15 NOTE — PROGRESS NOTES
Patient has diabetic diet ordered and ACHS and per pt wife and pt he is not diabetic so he was given regular diet. I did call Dr Portia Garsia and left a message and no return call and night nurse is aware. Patient is having some confusion and getting out of the bed. A posey has been placed and side rails up x3.

## 2017-02-15 NOTE — PROGRESS NOTES
A lot of confusion overnight and continues now. Pt c/o abd pain and according to wife has asked for pain pill but has not received. Has required several catheter irrigations for clots. P.E. URINE TINGED WITH IRRIGATION IN PROGRESS AT MODERATE REATE. Abd min tenderness without rebound. Genit - no swelling. Cath satisfactory position. Plan:  bec of confusion and hematuria will plan to make regular admission and keep until tomorrow. Will check urine without irrigation and traction.

## 2017-02-16 ENCOUNTER — APPOINTMENT (OUTPATIENT)
Dept: GENERAL RADIOLOGY | Age: 72
DRG: 709 | End: 2017-02-16
Attending: UROLOGY
Payer: MEDICARE

## 2017-02-16 ENCOUNTER — APPOINTMENT (OUTPATIENT)
Dept: CT IMAGING | Age: 72
DRG: 709 | End: 2017-02-16
Attending: INTERNAL MEDICINE
Payer: MEDICARE

## 2017-02-16 PROBLEM — R31.9 HEMATURIA: Status: ACTIVE | Noted: 2017-02-16

## 2017-02-16 PROBLEM — N40.0 PROSTATE ENLARGEMENT: Status: ACTIVE | Noted: 2017-02-16

## 2017-02-16 PROBLEM — N13.9 OBSTRUCTIVE UROPATHY: Status: ACTIVE | Noted: 2017-02-16

## 2017-02-16 PROBLEM — Z90.79 S/P TURP: Status: ACTIVE | Noted: 2017-02-16

## 2017-02-16 LAB
ALBUMIN SERPL BCP-MCNC: 2.8 G/DL (ref 3.2–4.6)
ALBUMIN/GLOB SERPL: 0.9 {RATIO} (ref 1.2–3.5)
ALP SERPL-CCNC: 65 U/L (ref 50–136)
ALT SERPL-CCNC: 11 U/L (ref 12–65)
ANION GAP BLD CALC-SCNC: 7 MMOL/L (ref 7–16)
AST SERPL W P-5'-P-CCNC: 23 U/L (ref 15–37)
BILIRUB SERPL-MCNC: 0.2 MG/DL (ref 0.2–1.1)
BUN SERPL-MCNC: 17 MG/DL (ref 8–23)
CALCIUM SERPL-MCNC: 8 MG/DL (ref 8.3–10.4)
CHLORIDE SERPL-SCNC: 103 MMOL/L (ref 98–107)
CO2 SERPL-SCNC: 32 MMOL/L (ref 21–32)
CREAT SERPL-MCNC: 1.24 MG/DL (ref 0.8–1.5)
D DIMER PPP FEU-MCNC: 0.5 UG/ML(FEU)
GLOBULIN SER CALC-MCNC: 3.1 G/DL (ref 2.3–3.5)
GLUCOSE SERPL-MCNC: 102 MG/DL (ref 65–100)
POTASSIUM SERPL-SCNC: 4.2 MMOL/L (ref 3.5–5.1)
PROT SERPL-MCNC: 5.9 G/DL (ref 6.3–8.2)
SODIUM SERPL-SCNC: 142 MMOL/L (ref 136–145)

## 2017-02-16 PROCEDURE — 74011250636 HC RX REV CODE- 250/636: Performed by: UROLOGY

## 2017-02-16 PROCEDURE — 87086 URINE CULTURE/COLONY COUNT: CPT | Performed by: UROLOGY

## 2017-02-16 PROCEDURE — 71010 XR CHEST PORT: CPT

## 2017-02-16 PROCEDURE — 80053 COMPREHEN METABOLIC PANEL: CPT | Performed by: INTERNAL MEDICINE

## 2017-02-16 PROCEDURE — 71260 CT THORAX DX C+: CPT

## 2017-02-16 PROCEDURE — 74011636320 HC RX REV CODE- 636/320: Performed by: UROLOGY

## 2017-02-16 PROCEDURE — 74011000258 HC RX REV CODE- 258: Performed by: UROLOGY

## 2017-02-16 PROCEDURE — 36415 COLL VENOUS BLD VENIPUNCTURE: CPT | Performed by: INTERNAL MEDICINE

## 2017-02-16 PROCEDURE — 85379 FIBRIN DEGRADATION QUANT: CPT | Performed by: INTERNAL MEDICINE

## 2017-02-16 PROCEDURE — 74011250637 HC RX REV CODE- 250/637: Performed by: UROLOGY

## 2017-02-16 PROCEDURE — 99218 HC RM OBSERVATION: CPT

## 2017-02-16 PROCEDURE — 65270000029 HC RM PRIVATE

## 2017-02-16 RX ORDER — IPRATROPIUM BROMIDE AND ALBUTEROL SULFATE 2.5; .5 MG/3ML; MG/3ML
3 SOLUTION RESPIRATORY (INHALATION)
Status: DISCONTINUED | OUTPATIENT
Start: 2017-02-16 | End: 2017-02-17 | Stop reason: HOSPADM

## 2017-02-16 RX ORDER — FACIAL-BODY WIPES
10 EACH TOPICAL
Status: DISCONTINUED | OUTPATIENT
Start: 2017-02-16 | End: 2017-02-16

## 2017-02-16 RX ORDER — SODIUM CHLORIDE 0.9 % (FLUSH) 0.9 %
10 SYRINGE (ML) INJECTION
Status: COMPLETED | OUTPATIENT
Start: 2017-02-16 | End: 2017-02-16

## 2017-02-16 RX ORDER — ENOXAPARIN SODIUM 100 MG/ML
40 INJECTION SUBCUTANEOUS EVERY 24 HOURS
Status: DISCONTINUED | OUTPATIENT
Start: 2017-02-16 | End: 2017-02-17

## 2017-02-16 RX ORDER — ACETAMINOPHEN 325 MG/1
650 TABLET ORAL
Status: DISCONTINUED | OUTPATIENT
Start: 2017-02-16 | End: 2017-02-17 | Stop reason: HOSPADM

## 2017-02-16 RX ADMIN — BISACODYL 10 MG: 10 SUPPOSITORY RECTAL at 10:30

## 2017-02-16 RX ADMIN — DIVALPROEX SODIUM 250 MG: 125 TABLET, DELAYED RELEASE ORAL at 16:56

## 2017-02-16 RX ADMIN — BISACODYL 10 MG: 10 SUPPOSITORY RECTAL at 09:23

## 2017-02-16 RX ADMIN — MEMANTINE HYDROCHLORIDE 10 MG: 5 TABLET ORAL at 09:23

## 2017-02-16 RX ADMIN — ATROPA BELLADONNA AND OPIUM 1 SUPPOSITORY: 16.2; 6 SUPPOSITORY RECTAL at 15:48

## 2017-02-16 RX ADMIN — SERTRALINE HYDROCHLORIDE 25 MG: 50 TABLET, FILM COATED ORAL at 09:23

## 2017-02-16 RX ADMIN — DIVALPROEX SODIUM 250 MG: 125 TABLET, DELAYED RELEASE ORAL at 09:23

## 2017-02-16 RX ADMIN — Medication 10 ML: at 17:13

## 2017-02-16 RX ADMIN — ACETAMINOPHEN 650 MG: 325 TABLET, FILM COATED ORAL at 14:18

## 2017-02-16 RX ADMIN — SODIUM CHLORIDE 100 ML: 900 INJECTION, SOLUTION INTRAVENOUS at 17:13

## 2017-02-16 RX ADMIN — IOVERSOL 100 ML: 741 INJECTION INTRA-ARTERIAL; INTRAVENOUS at 17:13

## 2017-02-16 RX ADMIN — CEFTRIAXONE 2 G: 2 INJECTION, POWDER, FOR SOLUTION INTRAMUSCULAR; INTRAVENOUS at 09:00

## 2017-02-16 RX ADMIN — Medication 10 ML: at 14:00

## 2017-02-16 RX ADMIN — MEMANTINE HYDROCHLORIDE 10 MG: 5 TABLET ORAL at 16:56

## 2017-02-16 NOTE — PROGRESS NOTES
Pt very short of breath this am. Pt requiring 2-3 L of oxygen. When pt gets up to bedside commode his O2 drops in between 68-85%. Pts O2 rebounds quickly once back in bed and focusing on breathing. Pt has had 3 very small BMs after 2 suppository's. IV replaced and antibiotic given. Temp is 100.7.

## 2017-02-16 NOTE — PROGRESS NOTES
Pt has had 2 small BMs this am and is voiding. Pt has not called to tell us when he has to void and is using briefs. No serial voids have been collected.

## 2017-02-16 NOTE — PROGRESS NOTES
Pt was c/o severe pain when I arrived, he was having bladder spasms. Urine was blood tinged. Catheter balloon deflated and cath removed. Pt was comfortable after removal.  Tmax 99.8 and WBC this a.m. Consistent with TURP 2 days ago. Abd tympanic this a.m. Will attempt to stimulate a b.m. with suppositories. Will discuss with nurse and  this a.m. After results with suppositories and discussion with wife.

## 2017-02-16 NOTE — PROGRESS NOTES
Call to spouse but no answer. Hoping she is en route to [facility]. Need to know who patient's PCP is. Need to know if patient was on home o2. Need to see if [facility] would be helpful and would need to [place] PCP to follow that has urologist is practicing in [place]. Await spouse arrival.     Only have a home phone number for her. Patient is alert and confused. He is de-satting with exertion. Ciara Wang        RN found an alternate number: [phone]. Patient was NOT on home o2. His PCP is Dr. Victor M Troy in [place] and spouse reports she spent the night in a hotel last night in [place] and will be here in about 30 - 40  Minutes. Ciara Wang

## 2017-02-16 NOTE — PROGRESS NOTES
Spoke with Dr. Temitope Holliday. Orders to d/c suppositories. Give Tylenol for fever. Consult hospitalist for SOB and drop in O2. MD concerned and wants to rule out PE, labs and chest x-ray ordered. DR. Temitope Holliday suggest CT if X-ray is negative.

## 2017-02-17 VITALS
BODY MASS INDEX: 27.32 KG/M2 | WEIGHT: 170 LBS | HEART RATE: 91 BPM | HEIGHT: 66 IN | SYSTOLIC BLOOD PRESSURE: 121 MMHG | TEMPERATURE: 99.8 F | RESPIRATION RATE: 18 BRPM | DIASTOLIC BLOOD PRESSURE: 61 MMHG | OXYGEN SATURATION: 92 %

## 2017-02-17 PROBLEM — R09.02 HYPOXIA: Status: ACTIVE | Noted: 2017-02-17

## 2017-02-17 PROBLEM — R91.1 PULMONARY NODULE: Status: ACTIVE | Noted: 2017-02-17

## 2017-02-17 PROBLEM — Z87.891 HISTORY OF TOBACCO USE: Chronic | Status: ACTIVE | Noted: 2017-02-17

## 2017-02-17 PROBLEM — J96.01 ACUTE RESPIRATORY FAILURE WITH HYPOXIA (HCC): Status: ACTIVE | Noted: 2017-02-17

## 2017-02-17 LAB
GLUCOSE BLD STRIP.AUTO-MCNC: 78 MG/DL (ref 65–100)
GLUCOSE BLD STRIP.AUTO-MCNC: 80 MG/DL (ref 65–100)

## 2017-02-17 PROCEDURE — 82962 GLUCOSE BLOOD TEST: CPT

## 2017-02-17 PROCEDURE — 99223 1ST HOSP IP/OBS HIGH 75: CPT | Performed by: INTERNAL MEDICINE

## 2017-02-17 PROCEDURE — 74011250636 HC RX REV CODE- 250/636: Performed by: INTERNAL MEDICINE

## 2017-02-17 PROCEDURE — 74011250637 HC RX REV CODE- 250/637: Performed by: UROLOGY

## 2017-02-17 PROCEDURE — 74011000258 HC RX REV CODE- 258: Performed by: UROLOGY

## 2017-02-17 PROCEDURE — 74011250636 HC RX REV CODE- 250/636: Performed by: UROLOGY

## 2017-02-17 RX ADMIN — DIVALPROEX SODIUM 250 MG: 125 TABLET, DELAYED RELEASE ORAL at 09:40

## 2017-02-17 RX ADMIN — CEFTRIAXONE 2 G: 2 INJECTION, POWDER, FOR SOLUTION INTRAMUSCULAR; INTRAVENOUS at 09:00

## 2017-02-17 RX ADMIN — TRAZODONE HYDROCHLORIDE 50 MG: 50 TABLET ORAL at 00:33

## 2017-02-17 RX ADMIN — Medication 5 ML: at 14:00

## 2017-02-17 RX ADMIN — MEMANTINE HYDROCHLORIDE 10 MG: 5 TABLET ORAL at 09:40

## 2017-02-17 RX ADMIN — Medication 10 ML: at 00:36

## 2017-02-17 RX ADMIN — ENOXAPARIN SODIUM 40 MG: 40 INJECTION SUBCUTANEOUS at 00:32

## 2017-02-17 RX ADMIN — SERTRALINE HYDROCHLORIDE 25 MG: 50 TABLET, FILM COATED ORAL at 09:40

## 2017-02-17 NOTE — PROGRESS NOTES
Care Management Interventions  PCP Verified by CM: Yes  Transition of Care Consult (CM Consult): Discharge Planning  Discharge Durable Medical Equipment: Yes  Physical Therapy Consult: No  Occupational Therapy Consult: No  Speech Therapy Consult: No  Current Support Network: Lives with Spouse  Confirm Follow Up Transport: Family  Plan discussed with Pt/Family/Caregiver: Yes  Freedom of Choice Offered: Yes  Discharge Location  Discharge Placement: Home with family assistance    Patient will discharge home to [place]. Oxygen order placed with [address] in [place] who agreed to deliver oxygen to room and then transfer order to their [place] office.

## 2017-02-17 NOTE — PROGRESS NOTES
Hospitalist Progress Note    2017  Admit Date: 2017  7:39 AM   NAME: Starlin Opitz   :  1945   MRN:  [id number]   Attending: Yessenia Li MD  PCP:  Not On File Bsi    SUBJECTIVE:     Starlin Opitz is a 66yoM admitted to the urology service for TURP. Hospitalist consulted yesterday for hypoxia. CXR was negative for pulm congestion or infiltrate. CT chest showed pulm nodules. Patient does have a significant smoking history. He is a poor historian, but reports that he used O2 at home for a while after a PNA last year. Currently denies CP, SOB, cough. Review of Systems negative with exception of pertinent positives noted above      PHYSICAL EXAM       Visit Vitals    BP (!) 132/35    Pulse 100    Temp 99.8 °F (37.7 °C)    Resp 18    Ht 5' 6\" (1.676 m)    Wt 77.1 kg (170 lb)    SpO2 92%    BMI 27.44 kg/m2      Temp (24hrs), Av.6 °F (37.6 °C), Min:98.6 °F (37 °C), Max:100.9 °F (38.3 °C)    Oxygen Therapy  O2 Sat (%): 92 % (17 0644)  Pulse via Oximetry: 85 beats per minute (17 08)  O2 Device: Nasal cannula (17 08)  O2 Flow Rate (L/min): 3 l/min (17 0816)    Intake/Output Summary (Last 24 hours) at 17 1118  Last data filed at 17 0825   Gross per 24 hour   Intake              360 ml   Output                0 ml   Net              360 ml          General: No acute distress. Walking around the room  Head:  Atraumatic Normocephalic. ENT:  NC in place  Lungs:  Mild decrease at bases; normal resp effort   CVS:  RRR, no BLE edema  Abdomen: Soft, NTTP  MSK:  Spontaneously moves extremities. Neurologic:  AOx3.  No focal deficits  Psychiatry:      No anxiety/Depression    Recent Results (from the past 24 hour(s))   D DIMER    Collection Time: 17  3:27 PM   Result Value Ref Range    D DIMER 0.50 <0.55 ug/ml(FEU)   METABOLIC PANEL, COMPREHENSIVE    Collection Time: 17  3:27 PM   Result Value Ref Range    Sodium 142 136 - 145 mmol/L    Potassium 4.2 3.5 - 5.1 mmol/L    Chloride 103 98 - 107 mmol/L    CO2 32 21 - 32 mmol/L    Anion gap 7 7 - 16 mmol/L    Glucose 102 (H) 65 - 100 mg/dL    BUN 17 8 - 23 MG/DL    Creatinine 1.24 0.8 - 1.5 MG/DL    GFR est AA >60 >60 ml/min/1.73m2    GFR est non-AA >60 >60 ml/min/1.73m2    Calcium 8.0 (L) 8.3 - 10.4 MG/DL    Bilirubin, total 0.2 0.2 - 1.1 MG/DL    ALT (SGPT) 11 (L) 12 - 65 U/L    AST (SGOT) 23 15 - 37 U/L    Alk. phosphatase 65 50 - 136 U/L    Protein, total 5.9 (L) 6.3 - 8.2 g/dL    Albumin 2.8 (L) 3.2 - 4.6 g/dL    Globulin 3.1 2.3 - 3.5 g/dL    A-G Ratio 0.9 (L) 1.2 - 3.5     CULTURE, URINE    Collection Time: 02/16/17  4:30 PM   Result Value Ref Range    Special Requests: NO SPECIAL REQUESTS      Culture result:        NO GROWTH AFTER SHORT PERIOD OF INCUBATION. FURTHER RESULTS TO FOLLOW AFTER OVERNIGHT INCUBATION. GLUCOSE, POC    Collection Time: 02/17/17  6:51 AM   Result Value Ref Range    Glucose (POC) 78 65 - 100 mg/dL         Imaging /Procedures /Studies   CT CHEST W CONT   Final Result   Impression:    1. No large or central PE. Smaller vessels not well evaluated. 2. Right upper lung nodule suspicious for malignancy. 3. Indeterminate right lower lung nodule. Small complex right pleural effusion. 4. Probable chronic fibrotic changes and emphysema, upper lobe predominant. 5. Atherosclerotic vascular disease. XR CHEST PORT   Final Result   IMPRESSION: No acute cardiopulmonary process.             ASSESSMENT      Hospital Problems as of 2/17/2017  Date Reviewed: 2/16/2017          Codes Class Noted - Resolved POA    Acute respiratory failure with hypoxia (HCC) ICD-10-CM: J96.01  ICD-9-CM: 518.81  2/17/2017 - Present No        * (Principal)Obstructive uropathy ICD-10-CM: N13.9  ICD-9-CM: 599.60  2/16/2017 - Present Yes        Prostate enlargement ICD-10-CM: N40.0  ICD-9-CM: 600.00  2/16/2017 - Present Yes        Hematuria ICD-10-CM: R31.9  ICD-9-CM: 599.70  2/16/2017 - Present Unknown S/P TURP ICD-10-CM: Z90.79  ICD-9-CM: V45.89  2/16/2017 - Present Unknown                  Plan:  - Given CT findings, may have some extent of hypoxia at baseline  - asked pulm to evaluate nodules on CT chest  - may need to go home with O2; will have RT walk once pulm evaluates    DVT Prophylaxis: holding Lovenox 2/2 hematuria  Dispo: home, likely with HH, when medically stable;  Hopefully this afternoon or tomorrow    Paris Shaikh MD

## 2017-02-17 NOTE — DISCHARGE INSTRUCTIONS
DISCHARGE SUMMARY from Nurse    The following personal items are in your possession at time of discharge:       Visual Aid: Glasses           Clothing: At bedside                PATIENT INSTRUCTIONS:    After general anesthesia or intravenous sedation, for 24 hours or while taking prescription Narcotics:  · Limit your activities  · Do not drive and operate hazardous machinery  · Do not make important personal or business decisions  · Do  not drink alcoholic beverages  · If you have not urinated within 8 hours after discharge, please contact your surgeon on call. Report the following to your surgeon:  · Excessive pain, swelling, redness or odor of or around the surgical area  · Temperature over 100.5  · Nausea and vomiting lasting longer than 4 hours or if unable to take medications  · Any signs of decreased circulation or nerve impairment to extremity: change in color, persistent  numbness, tingling, coldness or increase pain  · Any questions        What to do at Home:  Recommended activity: No lifting, Driving, or Strenuous exercise for 2 weeks    If you experience any of the following symptoms difficulty urinating, fever greater than 101, nausea/vomiting, or pain, please follow up with Dr. Uriah Mesa. *  Please give a list of your current medications to your Primary Care Provider. *  Please update this list whenever your medications are discontinued, doses are      changed, or new medications (including over-the-counter products) are added. *  Please carry medication information at all times in case of emergency situations. These are general instructions for a healthy lifestyle:    No smoking/ No tobacco products/ Avoid exposure to second hand smoke    Surgeon General's Warning:  Quitting smoking now greatly reduces serious risk to your health.     Obesity, smoking, and sedentary lifestyle greatly increases your risk for illness    A healthy diet, regular physical exercise & weight monitoring are important for maintaining a healthy lifestyle    You may be retaining fluid if you have a history of heart failure or if you experience any of the following symptoms:  Weight gain of 3 pounds or more overnight or 5 pounds in a week, increased swelling in our hands or feet or shortness of breath while lying flat in bed. Please call your doctor as soon as you notice any of these symptoms; do not wait until your next office visit. Recognize signs and symptoms of STROKE:    F-face looks uneven    A-arms unable to move or move unevenly    S-speech slurred or non-existent    T-time-call 911 as soon as signs and symptoms begin-DO NOT go       Back to bed or wait to see if you get better-TIME IS BRAIN. Warning Signs of HEART ATTACK     Call 911 if you have these symptoms:   Chest discomfort. Most heart attacks involve discomfort in the center of the chest that lasts more than a few minutes, or that goes away and comes back. It can feel like uncomfortable pressure, squeezing, fullness, or pain.  Discomfort in other areas of the upper body. Symptoms can include pain or discomfort in one or both arms, the back, neck, jaw, or stomach.  Shortness of breath with or without chest discomfort.  Other signs may include breaking out in a cold sweat, nausea, or lightheadedness. Don't wait more than five minutes to call 911 - MINUTES MATTER! Fast action can save your life. Calling 911 is almost always the fastest way to get lifesaving treatment. Emergency Medical Services staff can begin treatment when they arrive -- up to an hour sooner than if someone gets to the hospital by car. The discharge information has been reviewed with the patient. The patient verbalized understanding. Discharge medications reviewed with the patient and appropriate educational materials and side effects teaching were provided.

## 2017-02-17 NOTE — PROGRESS NOTES
Oxygen Qualifier       Room air: SpO2 with O2 and liter flow   Resting SpO2  90%     Ambulating SpO2  80% 83% on 1L, 86% on 2 L, 90% on 3L       Completed by:    Jose Ruvalcaba, RT

## 2017-02-17 NOTE — CONSULTS
CONSULT NOTE    Rosalina Galarza    2/17/2017    Date of Admission:  2/14/2017    The patient's chart is reviewed and the patient is discussed with the staff. Subjective:     Patient is a 67 y.o.  male seen and evaluated at the request of Dr. Lyna Dakins. He was admitted for an elective TURP. He felt more short of breath yesterday so a chest CT was obtained which revealed pulmonary nodules. He smoked for about 50 years before quitting about 3 years ago. He reports chronic dyspnea with exertion which at times limits his activity. He has heard intermittent wheezing. He has a chronic intermittent cough but no significant congestion. He had pneumonia last year and was given a nebulizer then but he no longer uses it. He was on oxygen for a short time then as well but weaned off. He denies any weight loss. Review of Systems  A comprehensive review of systems was negative except for: Constitutional: positive for none  Eyes: positive for none  Ears, nose, mouth, throat, and face: positive for none  Respiratory: positive for cough, wheezing or dyspnea on exertion  Cardiovascular: positive for none  Gastrointestinal: positive for none  Genitourinary: positive for hesitancy  Integument/breast: positive for none  Hematologic/lymphatic: positive for none  Musculoskeletal: positive for none  Neurological: positive for previous TIA but no deficits.  He has dementia with memory loss  Behvioral/Psych: positive for anxiety  Endocrine: positive for none  Allergic/Immunologic: positive for none    Patient Active Problem List   Diagnosis Code    Obstructive uropathy N13.9    Prostate enlargement N40.0    Hematuria R31.9    S/P TURP Z90.79    COPD (chronic obstructive pulmonary disease) ([facility]) J44.9    Dementia F03.90    BPH (benign prostatic hyperplasia) N40.0    Depression F32.9    Hypoxia R09.02    Pulmonary nodule R91.1    History of tobacco use Z87.891         Prior to Admission Medications Prescriptions Last Dose Informant Patient Reported? Taking?   aspirin delayed-release 81 mg tablet 2/13/2017 at Unknown time  Yes Yes   Sig: Take 81 mg by mouth nightly. Stopped- 1-29-17   divalproex DR (DEPAKOTE) 250 mg tablet 2/13/2017 at Unknown time  Yes Yes   Sig: Take 250 mg by mouth two (2) times a day. memantine (NAMENDA) 10 mg tablet 2/13/2017 at Unknown time  Yes Yes   Sig: Take 10 mg by mouth two (2) times a day. sertraline (ZOLOFT) 25 mg tablet 2/13/2017 at Unknown time  Yes Yes   Sig: Take 25 mg by mouth daily. tamsulosin (FLOMAX) 0.4 mg capsule 2/13/2017 at Unknown time  Yes Yes   Sig: Take 0.4 mg by mouth nightly. traZODone (DESYREL) 50 mg tablet 2/13/2017 at Unknown time  Yes Yes   Sig: Take  by mouth nightly. Facility-Administered Medications: None       Past Medical History   Diagnosis Date    Adverse effect of anesthesia      confusion-x 3 days after surgery-2015    Hypotension     Insomnia     Metabolic encephalopathy     Pneumonia     [facility]IRS [facility]ic inflammatory response syndrome) [facility] [address]     Stroke [facility] [address]      tia-2016- no residual    Syncope 2015     has- implanted cardiac monitor     Past Surgical History   Procedure Laterality Date    Hx cholecystectomy      Pr cardiac surg procedure unlist       implanted heart monitor    Hx colectomy       colostomy with reversal    Hx hernia repair Left      Social History     Social History    Marital status:      Spouse name: N/A    Number of children: N/A    Years of education: N/A     Occupational History    retired       Social History Main Topics    Smoking status: Former Smoker     Packs/day: 1.00     Years: 50.00     Quit date: 2/9/2014    Smokeless tobacco: Never Used      Comment: quit around 2014    Alcohol use Yes      Comment: occasional    Drug use: Not on file    Sexual activity: Not on file     Other Topics Concern    Not on file     Social History Narrative    .  Lives with wife     Family History   Problem Relation Age of Onset    Heart Disease Mother     Cancer Father      lung    Stroke Sister      Allergies   Allergen Reactions    Pcn [Penicillins] Other (comments)       Current Facility-Administered Medications   Medication Dose Route Frequency    acetaminophen (TYLENOL) tablet 650 mg  650 mg Oral Q6H PRN    albuterol-ipratropium (DUO-NEB) 2.5 MG-0.5 MG/3 ML  3 mL Nebulization Q4H PRN    oxyCODONE IR (ROXICODONE) tablet 5 mg  5 mg Oral ONCE PRN    HYDROcodone-acetaminophen (NORCO) 7.5-325 mg per tablet 1 Tab  1 Tab Oral PRN    naloxone (NARCAN) injection 0.1 mg  0.1 mg IntraVENous PRN    cefTRIAXone (ROCEPHIN) 2 g in 0.9% sodium chloride (MBP/ADV) 50 mL  2 g IntraVENous Q24H    divalproex DR (DEPAKOTE) tablet 250 mg  250 mg Oral BID    memantine (NAMENDA) tablet 10 mg  10 mg Oral BID    sertraline (ZOLOFT) tablet 25 mg  25 mg Oral DAILY    traZODone (DESYREL) tablet 50 mg  50 mg Oral QHS    sodium chloride (NS) flush 5-10 mL  5-10 mL IntraVENous Q8H    sodium chloride (NS) flush 5-10 mL  5-10 mL IntraVENous PRN    opium-belladonna (B&O 16-A SUPPRETTE) 16.2-60 mg suppository 1 Suppository  1 Suppository Rectal Q6H PRN         Objective:     Vitals:    02/16/17 2336 02/17/17 0407 02/17/17 0644 02/17/17 1047   BP: 139/53 104/57 (!) 132/35 121/61   Pulse: 96 90 100 91   Resp: 18 18 18 18   Temp: 99.4 °F (37.4 °C) 99.5 °F (37.5 °C) 99.8 °F (37.7 °C) 99.8 °F (37.7 °C)   SpO2: 95% 94% 92% 92%   Weight:       Height:           PHYSICAL EXAM     Constitutional:  the patient is well developed and in no acute distress  EENMT:  Sclera clear, pupils equal, oral mucosa moist  Respiratory: clear bilaterally. Wearing nasal cannula. Cardiovascular:  RRR without M,G,R  Gastrointestinal: soft and non-tender; with positive bowel sounds. Musculoskeletal: warm without cyanosis. There is no lower leg edema.   Skin:  no jaundice or rashes, no wounds   Neurologic: no gross neuro deficits     Psychiatric:  alert and oriented     Chest X-ray:            Recent Labs      02/15/17   1650  02/15/17   0829   WBC  14.3*   --    HGB  9.5*  10.0*   HCT  28.9*  29.4*   PLT  128*   --      Recent Labs      02/16/17   1527   NA  142   K  4.2   CL  103   GLU  102*   CO2  32   BUN  17   CREA  1.24   CA  8.0*   ALB  2.8*   TBILI  0.2   ALT  11*   SGOT  23           Assessment:  (Medical Decision Making)     Hospital Problems  Date Reviewed: 2/16/2017          Codes Class Noted POA    COPD (chronic obstructive pulmonary disease) ([facility] [address] (Chronic) ICD-10-CM: J44.9  ICD-9-CM: 473  Unknown Yes        BPH (benign prostatic hyperplasia) ICD-10-CM: N40.0  ICD-9-CM: 600.00  Unknown Yes        Hypoxia ICD-10-CM: R09.02  ICD-9-CM: 799.02  2/17/2017 No        Pulmonary nodule ICD-10-CM: R91.1  ICD-9-CM: 793.11  2/17/2017 Yes        History of tobacco use (Chronic) ICD-10-CM: D37.568  ICD-9-CM: V15.82  2/17/2017 Yes        * (Principal)Obstructive uropathy ICD-10-CM: N13.9  ICD-9-CM: 599.60  2/16/2017 Yes        Prostate enlargement ICD-10-CM: N40.0  ICD-9-CM: 600.00  2/16/2017 Yes        Hematuria ICD-10-CM: R31.9  ICD-9-CM: 599.70  2/16/2017 Yes        S/P TURP ICD-10-CM: Z90.79  ICD-9-CM: V45.89  2/16/2017 No              Plan:  (Medical Decision Making)   1. OK to discharge home. Will arrange for an outpatient PET scan and then follow up in the office to review results and decide about bronchoscopy  2. Assess oxygen needs - may need for home  3. Outpatient PFTs - suspect COPD. ? Severity. He is interested in a prn albuterol inhaler - will give script      Alana Kirby NP    More than 50% of the time documented was spent in face-to-face contact with the patient and in the care of the patient on the floor/unit where the patient is located. Lungs: No wheezing  Heart:  RRR with no Murmur/Rubs/Gallops    Additional Comments:   Will assess O2 needs and arrange OP PET and F/U for likely navigational Bronch and PFT.    I have spoken with and examined the patient. I agree with the above assessment and plan as documented.     Parisa Cardona MD

## 2017-02-17 NOTE — PROGRESS NOTES
Discharge instructions and prescriptions given and reviewed with pt and wife, verbalizes understanding, medication side effect sheet reviewed with pt, pt to be discharged home after home O2 tank delivered to room.

## 2017-02-17 NOTE — DISCHARGE SUMMARY
[address] [facility]       Name:  Tara Troncoso   MR#:  [id number]   :  1945   Account #:  [de-identified]   Date of Adm:  2017       ADMITTING DIAGNOSES   1. Obstructive uropathy. 2. Prostate enlargement. 3. Urinary retention. 4. Chronic obstructive lung disease. DISCHARGE DIAGNOSES   1. Obstructive uropathy. 2. Prostate enlargement. 3. Urinary retention. 4. Chronic obstructive lung disease. 5. Acute respiratory failure, uncertain etiology. POST DISCHARGE INSTRUCTIONS   1. Activities: Progress to normal in 1 month period of time. 2. Diet: Unlimited. 3. Prescriptions: The patient will be discharged on oxygen therapy. Otherwise, he is to continue medications as completed in the   medication reconciliation. ATTENDING PHYSICIAN: Dorota Morel. Daxa Campos MD    CONSULTATIONS: Hospitalist and Pulmonology. SUMMARY OF PRESENT ILLNESS AND HOSPITAL COURSE: A 25-year-old   white male with COPD, long history of smoking, admitted with   urinary retention, prostate enlargement, and obstructive   uropathy. On the day of admission, the patient underwent TURP. Post   surgery, the patient was admitted and started on continuous   bladder irrigation. The initial night, the patient had   significant altered mental status. On the first postoperative   day, the patient had a large amount of blood in the urine even   with irrigation in progress. He also had significant altered   mental status. The patient was continued on continuous bladder   irrigation. On the second postoperative day, the urine was blood-  tinged without irrigation. At that time, the Dodson catheter was   removed because the patient was having significant difficulty   with bladder spasms. Also, the patient was found to have   significant drops in O2 saturation with minimal exertion.  He was   subsequently seen in consultation by the hospitalist and by the   pulmonologist. On CT scan, the patient was noted to have a   nodule in the lung suggestive of malignancy. Post discharge, the patient will undergo outpatient evaluation   for the pulmonary nodule. The patient is discharged home on O2   therapy.         MD Sree Oliva / .Fabian   D:  02/17/2017   12:51   T:  02/17/2017   13:08   Job #:  [id number]     Pulmonology

## 2017-02-17 NOTE — CONSULTS
HOSPITALIST H&P/CONSULT  NAME:  Nikita Cline   Age:  67 y.o.  :   1945   MRN:   [id number]  PCP: Not On File [facility]  Consulting MD:  Treatment Team: Attending Provider: Ehsan Bailey MD; Utilization Review: Selene Harris RN; Care Manager: Cortney Platt; Consulting Provider: Lazaro Gallegos MD  HPI:   Patient 9M with pmhx of dementia, >40pk year tobacco history and BPH with urinary retention admitted for TURP s/p procedure  without urologic complications. Was noted to have extreme oxygen desaturations to 70's with any level of exertion and therefore hospitalist consulted. Pt denies cough, hemoptysis, chest pain, tightness, audible wheezing, known sleep apnea. He is poor historian and defer nearly every question to wife at bedside. She denies noticing any difficulties with his breathing while at home. Says these symptoms starting only on arrival to hospital.       Complete ROS done and is as stated in HPI or otherwise negative  Past Medical History   Diagnosis Date    Adverse effect of anesthesia      confusion-x 3 days after surgery-    BPH (benign prostatic hyperplasia)     COPD (chronic obstructive pulmonary disease) (HCC)     Dementia     Depression     Hypotension     Insomnia     Metabolic encephalopathy     Pneumonia     [facility]IRS [facility]ic [facility] response syndrome[facility]     Stroke ([facility]      tia-- no residual    Syncope      has- implanted cardiac monitor      Past Surgical History   Procedure Laterality Date    Hx cholecystectomy      Pr cardiac surg procedure unlist       implanted heart monitor    Hx colectomy       colostomy with reversal    Hx hernia repair Left       Prior to Admission Medications   Prescriptions Last Dose Informant Patient Reported? Taking?   aspirin delayed-release 81 mg tablet 2017 at Unknown time  Yes Yes   Sig: Take 81 mg by mouth nightly.  Stopped- 17   divalproex DR (DEPAKOTE) 250 mg tablet 2017 at Unknown time  Yes Yes   Sig: Take 250 mg by mouth two (2) times a day. memantine (NAMENDA) 10 mg tablet 2017 at Unknown time  Yes Yes   Sig: Take 10 mg by mouth two (2) times a day. sertraline (ZOLOFT) 25 mg tablet 2017 at Unknown time  Yes Yes   Sig: Take 25 mg by mouth daily. tamsulosin (FLOMAX) 0.4 mg capsule 2017 at Unknown time  Yes Yes   Sig: Take 0.4 mg by mouth nightly. traZODone (DESYREL) 50 mg tablet 2017 at Unknown time  Yes Yes   Sig: Take  by mouth nightly. Facility-Administered Medications: None     Allergies   Allergen Reactions    Pcn [Penicillins] Other (comments)      Social History   Substance Use Topics    Smoking status: Former Smoker     Packs/day: 1.00     Years: 50.00     Quit date: 2014    Smokeless tobacco: Never Used    Alcohol use Yes      Comment: occasional      No family history on file. Objective:     Visit Vitals    /42    Pulse (!) 105    Temp 98.6 °F (37 °C)    Resp 18    Ht 5' 6\" (1.676 m)    Wt 77.1 kg (170 lb)    SpO2 94%    BMI 27.44 kg/m2      Temp (24hrs), Av.5 °F (37.5 °C), Min:98.5 °F (36.9 °C), Max:100.9 °F (38.3 °C)    Oxygen Therapy  O2 Sat (%): 94 % (17)  Pulse via Oximetry: 85 beats per minute (17)  O2 Device: Nasal cannula (17)  O2 Flow Rate (L/min): 3 l/min (17)  Physical Exam:  General:    Alert, cooperative, no distress, appears stated age. Head:   Normocephalic, without obvious abnormality, atraumatic. Nose:  Nares normal. No drainage or sinus tenderness. Lungs:   Diminished bilaterally w/o rales/rhonchi  Heart:   Regular rate and rhythm,  no murmur, rub or gallop. Abdomen:   Soft, non-tender. Not distended. Bowel sounds normal.   Extremities: No cyanosis. No edema. No clubbing  Skin:     Texture, turgor normal. No rashes or lesions.   Not Jaundiced  Neurologic: Alert and oriented x 3, no focal deficits   Data Review:   Recent Results (from the past 24 hour(s)) D DIMER    Collection Time: 02/16/17  3:27 PM   Result Value Ref Range    D DIMER 0.50 <0.55 ug/ml(FEU)   METABOLIC PANEL, COMPREHENSIVE    Collection Time: 02/16/17  3:27 PM   Result Value Ref Range    Sodium 142 136 - 145 mmol/L    Potassium 4.2 3.5 - 5.1 mmol/L    Chloride 103 98 - 107 mmol/L    CO2 32 21 - 32 mmol/L    Anion gap 7 7 - 16 mmol/L    Glucose 102 (H) 65 - 100 mg/dL    BUN 17 8 - 23 MG/DL    Creatinine 1.24 0.8 - 1.5 MG/DL    GFR est AA >60 >60 ml/min/1.73m2    GFR est non-AA >60 >60 ml/min/1.73m2    Calcium 8.0 (L) 8.3 - 10.4 MG/DL    Bilirubin, total 0.2 0.2 - 1.1 MG/DL    ALT (SGPT) 11 (L) 12 - 65 U/L    AST (SGOT) 23 15 - 37 U/L    Alk. phosphatase 65 50 - 136 U/L    Protein, total 5.9 (L) 6.3 - 8.2 g/dL    Albumin 2.8 (L) 3.2 - 4.6 g/dL    Globulin 3.1 2.3 - 3.5 g/dL    A-G Ratio 0.9 (L) 1.2 - 3.5       Imaging /Procedures /Studies   CXR - non acute     CT chest with contrast  IMPRESSION  Impression:   1. No large or central PE. Smaller vessels not well evaluated. 2. Right upper lung nodule suspicious for malignancy. 3. Indeterminate right lower lung nodule. Small complex right pleural effusion. 4. Probable chronic fibrotic changes and emphysema, upper lobe predominant. 5. Atherosclerotic vascular disease.       Assessment and Plan: Active Hospital Problems    Diagnosis Date Noted    Obstructive uropathy 02/16/2017    Prostate enlargement 02/16/2017    Hematuria 02/16/2017    S/P TURP 02/16/2017     A/P  - Acute hypoxic respiratory failure - new oxygen requirement. - Pulm Nodules - CT chest with RUL/RLL nodules and complex right pleural effusion plus emphesymatous changes. Will request pulm eval.  Negative for central PE but will add lovenox prophy dose. Add duonebs. Monitor for hemturia.   - s/p TURP - on 2-14. Mgmt per urology. - Dementia - moderate. Cont home meds.  Will add ppd in case placement is needed    Code Status: Full code as discussed at bedside      Signed By: Sonia Yin,      February 16, 2017

## 2017-02-19 LAB
BACTERIA SPEC CULT: NORMAL
SERVICE CMNT-IMP: NORMAL

## 2017-03-15 ENCOUNTER — HOSPITAL ENCOUNTER (OUTPATIENT)
Dept: LAB | Age: 72
Discharge: HOME | End: 2017-03-15
Payer: MEDICARE

## 2017-03-15 DIAGNOSIS — R91.1 LUNG NODULE: ICD-10-CM

## 2017-03-15 LAB
ALBUMIN SERPL BCP-MCNC: 3.4 G/DL (ref 3.2–4.6)
ALBUMIN/GLOB SERPL: 0.8 {RATIO} (ref 1.2–3.5)
ALP SERPL-CCNC: 108 U/L (ref 50–136)
ALT SERPL-CCNC: 16 U/L (ref 12–65)
ANION GAP BLD CALC-SCNC: 6 MMOL/L (ref 7–16)
AST SERPL W P-5'-P-CCNC: 17 U/L (ref 15–37)
BASOPHILS # BLD AUTO: 0.1 K/UL (ref 0–0.2)
BASOPHILS # BLD: 2 % (ref 0–2)
BILIRUB SERPL-MCNC: 0.3 MG/DL (ref 0.2–1.1)
BUN SERPL-MCNC: 15 MG/DL (ref 8–23)
CALCIUM SERPL-MCNC: 8.6 MG/DL (ref 8.3–10.4)
CHLORIDE SERPL-SCNC: 101 MMOL/L (ref 98–107)
CO2 SERPL-SCNC: 31 MMOL/L (ref 23–32)
CREAT SERPL-MCNC: 1.3 MG/DL (ref 0.8–1.5)
DIFFERENTIAL METHOD BLD: ABNORMAL
EOSINOPHIL # BLD: 0.5 K/UL (ref 0–0.8)
EOSINOPHIL NFR BLD: 6 % (ref 0.5–7.8)
ERYTHROCYTE [DISTWIDTH] IN BLOOD BY AUTOMATED COUNT: 14.1 % (ref 11.9–14.6)
GLOBULIN SER CALC-MCNC: 4.2 G/DL (ref 2.3–3.5)
GLUCOSE SERPL-MCNC: 86 MG/DL (ref 65–100)
HCT VFR BLD AUTO: 36.3 % (ref 41.1–50.3)
HGB BLD-MCNC: 11.6 G/DL (ref 13.6–17.2)
LYMPHOCYTES # BLD AUTO: 24 % (ref 13–44)
LYMPHOCYTES # BLD: 1.9 K/UL (ref 0.5–4.6)
MCH RBC QN AUTO: 27.4 PG (ref 26.1–32.9)
MCHC RBC AUTO-ENTMCNC: 32 G/DL (ref 31.4–35)
MCV RBC AUTO: 85.8 FL (ref 79.6–97.8)
MONOCYTES # BLD: 0.8 K/UL (ref 0.1–1.3)
MONOCYTES NFR BLD AUTO: 10 % (ref 4–12)
NEUTS SEG # BLD: 4.7 K/UL (ref 1.7–8.2)
NEUTS SEG NFR BLD AUTO: 59 % (ref 43–78)
NRBC # BLD: 0.01 K/UL (ref 0–0.2)
PLATELET # BLD AUTO: 175 K/UL (ref 150–450)
PMV BLD AUTO: 11.2 FL (ref 10.8–14.1)
POTASSIUM SERPL-SCNC: 4.2 MMOL/L (ref 3.5–5.1)
PROT SERPL-MCNC: 7.6 G/DL (ref 6.3–8.2)
RBC # BLD AUTO: 4.23 M/UL (ref 4.23–5.67)
SODIUM SERPL-SCNC: 138 MMOL/L (ref 136–145)
WBC # BLD AUTO: 7.9 K/UL (ref 4.3–11.1)

## 2017-03-15 PROCEDURE — 80053 COMPREHEN METABOLIC PANEL: CPT | Performed by: INTERNAL MEDICINE

## 2017-03-15 PROCEDURE — 36415 COLL VENOUS BLD VENIPUNCTURE: CPT | Performed by: INTERNAL MEDICINE

## 2017-03-15 PROCEDURE — 85025 COMPLETE CBC W/AUTO DIFF WBC: CPT | Performed by: INTERNAL MEDICINE

## 2017-04-12 ENCOUNTER — APPOINTMENT (OUTPATIENT)
Dept: CT IMAGING | Age: 72
End: 2017-04-12
Payer: MEDICARE

## 2017-04-12 ENCOUNTER — HOSPITAL ENCOUNTER (OUTPATIENT)
Age: 72
Setting detail: OUTPATIENT SURGERY
Discharge: HOME | End: 2017-04-12
Attending: INTERNAL MEDICINE | Admitting: INTERNAL MEDICINE
Payer: MEDICARE

## 2017-04-12 ENCOUNTER — SURGERY (OUTPATIENT)
Age: 72
End: 2017-04-12

## 2017-04-12 ENCOUNTER — APPOINTMENT (OUTPATIENT)
Dept: GENERAL RADIOLOGY | Age: 72
End: 2017-04-12
Attending: INTERNAL MEDICINE
Payer: MEDICARE

## 2017-04-12 VITALS
OXYGEN SATURATION: 90 % | SYSTOLIC BLOOD PRESSURE: 114 MMHG | DIASTOLIC BLOOD PRESSURE: 56 MMHG | HEART RATE: 64 BPM | BODY MASS INDEX: 25.71 KG/M2 | TEMPERATURE: 99.4 F | WEIGHT: 160 LBS | RESPIRATION RATE: 16 BRPM | HEIGHT: 66 IN

## 2017-04-12 DIAGNOSIS — R91.1 PULMONARY NODULE: ICD-10-CM

## 2017-04-12 PROCEDURE — 99152 MOD SED SAME PHYS/QHP 5/>YRS: CPT | Performed by: INTERNAL MEDICINE

## 2017-04-12 PROCEDURE — 31629 BRONCHOSCOPY/NEEDLE BX EACH: CPT | Performed by: INTERNAL MEDICINE

## 2017-04-12 PROCEDURE — 71250 CT THORAX DX C-: CPT

## 2017-04-12 PROCEDURE — 31653 BRONCH EBUS SAMPLNG 3/> NODE: CPT | Performed by: INTERNAL MEDICINE

## 2017-04-12 PROCEDURE — 31627 NAVIGATIONAL BRONCHOSCOPY: CPT | Performed by: INTERNAL MEDICINE

## 2017-04-12 PROCEDURE — 88172 CYTP DX EVAL FNA 1ST EA SITE: CPT | Performed by: INTERNAL MEDICINE

## 2017-04-12 PROCEDURE — 31623 DX BRONCHOSCOPE/BRUSH: CPT | Performed by: INTERNAL MEDICINE

## 2017-04-12 PROCEDURE — 77030012699 HC VLV SUC CNTRL OCOA -A: Performed by: INTERNAL MEDICINE

## 2017-04-12 PROCEDURE — 88177 CYTP FNA EVAL EA ADDL: CPT | Performed by: INTERNAL MEDICINE

## 2017-04-12 PROCEDURE — 74011000250 HC RX REV CODE- 250: Performed by: INTERNAL MEDICINE

## 2017-04-12 PROCEDURE — 87102 FUNGUS ISOLATION CULTURE: CPT | Performed by: INTERNAL MEDICINE

## 2017-04-12 PROCEDURE — 88112 CYTOPATH CELL ENHANCE TECH: CPT | Performed by: INTERNAL MEDICINE

## 2017-04-12 PROCEDURE — 76040000026: Performed by: INTERNAL MEDICINE

## 2017-04-12 PROCEDURE — 31628 BRONCHOSCOPY/LUNG BX EACH: CPT | Performed by: INTERNAL MEDICINE

## 2017-04-12 PROCEDURE — 87070 CULTURE OTHR SPECIMN AEROBIC: CPT | Performed by: INTERNAL MEDICINE

## 2017-04-12 PROCEDURE — 87116 MYCOBACTERIA CULTURE: CPT | Performed by: INTERNAL MEDICINE

## 2017-04-12 PROCEDURE — 74011250636 HC RX REV CODE- 250/636

## 2017-04-12 PROCEDURE — 77030031404 HC PTCH SENS SD DISP SPDM -A: Performed by: INTERNAL MEDICINE

## 2017-04-12 PROCEDURE — 99153 MOD SED SAME PHYS/QHP EA: CPT | Performed by: INTERNAL MEDICINE

## 2017-04-12 PROCEDURE — 77030021922 HC FCPS BIOP SD DISP SPDM -D: Performed by: INTERNAL MEDICINE

## 2017-04-12 PROCEDURE — 88305 TISSUE EXAM BY PATHOLOGIST: CPT | Performed by: INTERNAL MEDICINE

## 2017-04-12 PROCEDURE — 74011250636 HC RX REV CODE- 250/636: Performed by: INTERNAL MEDICINE

## 2017-04-12 PROCEDURE — 77030021921 HC NDL BIOP SD SPDM -E: Performed by: INTERNAL MEDICINE

## 2017-04-12 PROCEDURE — 77030003406 HC NDL ASPIR BIOP OCOA -C: Performed by: INTERNAL MEDICINE

## 2017-04-12 PROCEDURE — 88173 CYTOPATH EVAL FNA REPORT: CPT | Performed by: INTERNAL MEDICINE

## 2017-04-12 PROCEDURE — 87106 FUNGI IDENTIFICATION YEAST: CPT | Performed by: INTERNAL MEDICINE

## 2017-04-12 PROCEDURE — 77030028571 HC CATH ENDOBRNCH DISP BIOP KT SPMD -G1: Performed by: INTERNAL MEDICINE

## 2017-04-12 PROCEDURE — 31624 DX BRONCHOSCOPE/LAVAGE: CPT | Performed by: INTERNAL MEDICINE

## 2017-04-12 PROCEDURE — 77030009046 HC CATH BRNCH BLLN OCOA -B: Performed by: INTERNAL MEDICINE

## 2017-04-12 RX ORDER — SODIUM CHLORIDE 0.9 % (FLUSH) 0.9 %
5-10 SYRINGE (ML) INJECTION AS NEEDED
Status: DISCONTINUED | OUTPATIENT
Start: 2017-04-12 | End: 2017-04-12 | Stop reason: HOSPADM

## 2017-04-12 RX ORDER — MIDAZOLAM HYDROCHLORIDE 1 MG/ML
.25-5 INJECTION, SOLUTION INTRAMUSCULAR; INTRAVENOUS
Status: DISCONTINUED | OUTPATIENT
Start: 2017-04-12 | End: 2017-04-12 | Stop reason: HOSPADM

## 2017-04-12 RX ORDER — LIDOCAINE HYDROCHLORIDE 20 MG/ML
JELLY TOPICAL AS NEEDED
Status: DISCONTINUED | OUTPATIENT
Start: 2017-04-12 | End: 2017-04-12 | Stop reason: HOSPADM

## 2017-04-12 RX ORDER — SODIUM CHLORIDE 9 MG/ML
25 INJECTION, SOLUTION INTRAVENOUS CONTINUOUS
Status: DISCONTINUED | OUTPATIENT
Start: 2017-04-12 | End: 2017-04-12 | Stop reason: HOSPADM

## 2017-04-12 RX ORDER — SODIUM CHLORIDE 0.9 % (FLUSH) 0.9 %
5-10 SYRINGE (ML) INJECTION EVERY 8 HOURS
Status: DISCONTINUED | OUTPATIENT
Start: 2017-04-12 | End: 2017-04-12 | Stop reason: HOSPADM

## 2017-04-12 RX ORDER — FENTANYL CITRATE 50 UG/ML
50 INJECTION, SOLUTION INTRAMUSCULAR; INTRAVENOUS
Status: DISCONTINUED | OUTPATIENT
Start: 2017-04-12 | End: 2017-04-12 | Stop reason: HOSPADM

## 2017-04-12 RX ORDER — LIDOCAINE HYDROCHLORIDE 40 MG/ML
SOLUTION TOPICAL AS NEEDED
Status: DISCONTINUED | OUTPATIENT
Start: 2017-04-12 | End: 2017-04-12 | Stop reason: HOSPADM

## 2017-04-12 RX ADMIN — PROMETHAZINE HYDROCHLORIDE 6.25 MG: 25 INJECTION, SOLUTION INTRAMUSCULAR; INTRAVENOUS at 12:33

## 2017-04-12 RX ADMIN — LIDOCAINE HYDROCHLORIDE 1 ML: 20 JELLY TOPICAL at 12:09

## 2017-04-12 RX ADMIN — FENTANYL CITRATE 50 MCG: 50 INJECTION, SOLUTION INTRAMUSCULAR; INTRAVENOUS at 12:01

## 2017-04-12 RX ADMIN — FENTANYL CITRATE 50 MCG: 50 INJECTION, SOLUTION INTRAMUSCULAR; INTRAVENOUS at 12:35

## 2017-04-12 RX ADMIN — SODIUM CHLORIDE 25 ML/HR: 900 INJECTION, SOLUTION INTRAVENOUS at 11:30

## 2017-04-12 RX ADMIN — FENTANYL CITRATE 50 MCG: 50 INJECTION, SOLUTION INTRAMUSCULAR; INTRAVENOUS at 12:40

## 2017-04-12 RX ADMIN — LIDOCAINE HYDROCHLORIDE 7 ML: 40 SOLUTION TOPICAL at 12:09

## 2017-04-12 RX ADMIN — MIDAZOLAM HYDROCHLORIDE 2 MG: 1 INJECTION, SOLUTION INTRAMUSCULAR; INTRAVENOUS at 12:01

## 2017-04-12 RX ADMIN — FENTANYL CITRATE 50 MCG: 50 INJECTION, SOLUTION INTRAMUSCULAR; INTRAVENOUS at 12:06

## 2017-04-12 RX ADMIN — MIDAZOLAM HYDROCHLORIDE 1 MG: 1 INJECTION, SOLUTION INTRAMUSCULAR; INTRAVENOUS at 12:50

## 2017-04-12 NOTE — DISCHARGE INSTRUCTIONS
RESPIRATORY CARE - BRONCHOSCOPY - DISCHARGE INSTRUCTIONS      You received a lot of numbing medication for your throat and nose, and you also received medication to make you sleepy during your procedure. Because of this and because the bronchoscopy may have irritated your airways, we ask that you follow these directions:    1. Do not eat or drink until  3:30 . After that, you may have what you please. You may want to try some liquids first, because your throat may be a little sore. 2. Medication may cause drowsiness for several hours, therefore, do not drive or  operate machinery for remainder of the day. No alcohol today. 3. You may cough up more mucus than usual and you may see some blood, but this is expected and should subside by the following day. 4. If severe throat irritation, coughing, or bleeding continue, call your doctor. 5.         If you run a fever greater than 102, call [facility] at [phone]. 6.         Dr. Jillian Hogan has asked that you:                A. Call the doctor's office at [phone] for problems. Fabricio Blake See Dr Erica Ortega on 4/18/17 as previously scheduled. Maryuri Mondragon Discharge Medications:         Any additional instructions:  Motrin or tylenol for fever. Federico Aguila at [facility] is scheduling an appointment for you to have Pulmonary Function Testing at [address] [place] [address]  [phone]. It is 4/24/17 at 0930. Please call her with any questions about this appointment.     Instructions given to Valentino Garza and other family members  Instructions given by:

## 2017-04-12 NOTE — IP AVS SNAPSHOT
Summary of Care Report The Summary of Care report has been created to help improve care coordination. Users with access to [facility] or [facility] (Web-based application) may access additional patient information including the Discharge Summary. If you are not currently a [address] user and need more information, please call the number listed below in the [place] [address] section and ask to be connected with Medical Records. Facility Information Name Address Phone [address] [address] [address] [phone] Patient Information Patient Name Sex CANDACE Dyer ([id number]) Male 1945 Discharge Information Admitting Provider Service Area Unit Mandi Medina MD / [address] Endoscopy / [phone] Discharge Provider Discharge Date/Time Discharge Disposition Destination (none) (none) (none) (none) Patient Language Language ENGLISH [13] Hospital Problems as of 2017  Reviewed: 2017  6:56 AM by Magda Licona MD  
 None Non-Hospital Problems as of 2017  Reviewed: 2017  6:56 AM by Magda Licona MD  
  
  
  
 Class Noted - Resolved Last Modified Active Problems Obstructive uropathy  2017 - Present 2017 by Magda Licona MD  
  Entered by Magda Licona MD  
  Prostate enlargement  2017 - Present 2017 by Magda Licona MD  
  Entered by Magda Licona MD  
  Hematuria  2017 - Present 2017 by Maggy Plunkett NP Entered by Magda Licona MD  
  S/P TURP  2017 - Present 2017 by Maggy Plunkett NP Entered by Magda Licona MD  
  COPD (chronic obstructive pulmonary disease) [facility]) (Chronic)  Unknown - Present 2017 by Maggy Plunkett NP   Entered by Maggy Plunkett NP  
 Dementia (Chronic)  Unknown - Present 2/17/2017 by Kailee Hammond, NP Entered by Kailee Hammond, NP  
  BPH (benign prostatic hyperplasia)  Unknown - Present 2/17/2017 by Kailee Hammond, NP Entered by Kailee Hammond, NP Depression (Chronic)  Unknown - Present 2/17/2017 by Kailee Hammond, NP Entered by Kailee Hammond, NP Hypoxia  2/17/2017 - Present 2/17/2017 by Kailee Hammond, NP Entered by Kailee Hammond, NP  
  Pulmonary nodule  2/17/2017 - Present 2/17/2017 by Kailee Hammond, NP Entered by Kailee Hammond, NP History of tobacco use (Chronic)  2/17/2017 - Present 2/17/2017 by Kailee Hammond, NP Entered by Kailee Hammond, NP You are allergic to the following Allergen Reactions Pcn (Penicillins) Other (comments) Unknown as infant Current Discharge Medication List  
  
ASK your doctor about these medications Dose & Instructions Dispensing Information Comments  
 albuterol sulfate 90 mcg/actuation Aepb Commonly known as:  Akua Maffucci Dose:  2 Puff Take 2 Puffs by inhalation every six (6) hours as needed. Quantity:  1 Inhaler Refills:  1  
   
 divalproex  mg tablet Commonly known as:  DEPAKOTE Dose:  250 mg Take 250 mg by mouth two (2) times a day. Refills:  0  
   
 NAMENDA 10 mg tablet Generic drug:  memantine Dose:  10 mg Take 10 mg by mouth two (2) times a day. Refills:  0  
   
 traZODone 50 mg tablet Commonly known as:  Hornbrook Belling Take  by mouth nightly. Refills:  0  
   
 ZOLOFT 25 mg tablet Generic drug:  sertraline Dose:  25 mg Take 25 mg by mouth daily. Refills:  0 Surgery Information ID Date/Time Status Primary Surgeon All Procedures Location [id number] 4/12/2017 1020 Unposted Galina Kearns MD BRONCHOSCOPY WITH RADIAL PROBE 
ENDOSCOPIC BRONCHOSCOPY ULTRASOUND (EBUS) BRONCHOSCOPY 
BRONCHOSCOPY NAVIGATIONAL 
TRANSBRONCHIAL BIOPSY 
FINE NEEDLE ASPIRATION 
 ENDOSCOPIC BRUSHING 
BRONCHIAL ALVEOLAR LAVAGE [facility]    
 BRONCHOSCOPY WITH RADIAL PROBE:  [address] [facility] [place] [address] Follow-up Information None Discharge Instructions RESPIRATORY CARE - BRONCHOSCOPY - DISCHARGE INSTRUCTIONS You received a lot of numbing medication for your throat and nose, and you also received medication to make you sleepy during your procedure. Because of this and because the bronchoscopy may have irritated your airways, we ask that you follow these directions: 1. Do not eat or drink until  3:30 . After that, you may have what you please. You may want to try some liquids first, because your throat may be a little sore. 2. Medication may cause drowsiness for several hours, therefore, do not drive or  operate machinery for remainder of the day. No alcohol today. 3. You may cough up more mucus than usual and you may see some blood, but this is expected and should subside by the following day. 4. If severe throat irritation, coughing, or bleeding continue, call your doctor. 5.         If you run a fever greater than 102, call [facility] at [phone]. 6.         Dr. Pennington Oh has asked that you: A. Call the doctor's office at [phone] for problems. Leafy Bones See Dr Christy Hunt on 4/18/17 as previously scheduled. Lydia Alba Discharge Medications: 
 
  
 
Any additional instructions:  Motrin or tylenol for fever. Luca Baires at [facility] is scheduling an appointment for you to have Pulmonary Function Testing at [facility]  [phone]. Please call her with any questions about this appointment. Instructions given to hCristiano Lockett and other family members Instructions given by:   
 
Chart Review Routing History Recipient Method Report Sent By Ludmila Dejesus MD  
Fax: [phone] Phone: [phone]  Fax [facility] IP AMB RESULT REPORT [address] [facility] 150, [address] [facility] 2/9/2017  4:07 PM 2/9/2017 Aron Raymond MD  
Fax: [phone] Phone: [phone] Fax IP Auto Routed Diana Sharma MD [phone] 2/14/2017  9:03 AM 02/14/2017 Aron Raymond MD  
Fax: [phone] Phone: [phone] Fax IP Auto Routed Diana Sharma MD [phone] 2/18/2017  5:26 PM 02/18/2017 Aron Raymond MD  
Fax: [phone] Phone: [phone] Fax IP Auto Routed Diana Sharma MD [phone] 2/18/2017  5:28 PM 02/18/2017 Kenji Bennett MD  
Phone: [phone] In Basket IP Auto Routed Diana Sharma MD [[id number]] 2/18/2017  5:28 PM 02/18/2017

## 2017-04-12 NOTE — IP AVS SNAPSHOT
[address] [facility] 
[phone] Patient: Valentina Wright MRN: [id number] HKT:[id number] You are allergic to the following Allergen Reactions Pcn (Penicillins) Other (comments) Unknown as infant Recent Documentation Height Weight BMI Smoking Status 1.676 m 72.6 kg 25.82 kg/m2 Former Smoker Unresulted Labs Order Current Status AFB CULTURE + SMEAR W/RFLX ID FROM CULTURE In process CULTURE, RESPIRATORY/SPUTUM/BRONCH W GRAM STAIN In process FUNGUS CULTURE AND SMEAR In process Emergency Contacts Name Discharge Info Relation Home Work Mobile PlummerNissa DISCHARGE CAREGIVER [3] Spouse [3] [phone] About your hospitalization You were admitted on:  April 12, 2017 You last received care in the:  [facility] You were discharged on:  April 12, 2017 Unit phone number:  [phone] Why you were hospitalized Your primary diagnosis was:  Not on File Providers Seen During Your Hospitalizations Provider Role Specialty Primary office phone Alma Tejeda MD Attending Provider Pulmonary Disease [phone] Your Primary Care Physician (PCP) Primary Care Physician Office Phone Office Fax NOT ON FILE ** None ** ** None ** Follow-up Information None Your Appointments Tuesday April 18, 2017  3:15 PM EDT [facility] JULIO/ Charan Arias [address] [facility] [address]  
[phone] Monday April 24, 2017  9:45 AM EDT Complete PFTS with PP PFT LAB [facility] ([facility]) [address] [facility] [address]  
[phone] Please arrive 30 minutes before your scheduled appointment time.  If you are also having a chest X-ray or CT on the day of your appointment, please call our office to find out what time to arrive. Current Discharge Medication List  
  
ASK your doctor about these medications Dose & Instructions Dispensing Information Comments Morning Noon Evening Bedtime  
 albuterol sulfate 90 mcg/actuation Aepb Commonly known as:  Nicolas Bryant Your last dose was: Your next dose is:    
   
   
 Dose:  2 Puff Take 2 Puffs by inhalation every six (6) hours as needed. Quantity:  1 Inhaler Refills:  1  
     
   
   
   
  
 divalproex  mg tablet Commonly known as:  DEPAKOTE Your last dose was: Your next dose is:    
   
   
 Dose:  250 mg Take 250 mg by mouth two (2) times a day. Refills:  0  
     
   
   
   
  
 NAMENDA 10 mg tablet Generic drug:  memantine Your last dose was: Your next dose is:    
   
   
 Dose:  10 mg Take 10 mg by mouth two (2) times a day. Refills:  0  
     
   
   
   
  
 traZODone 50 mg tablet Commonly known as:  Hermon  Your last dose was: Your next dose is: Take  by mouth nightly. Refills:  0  
     
   
   
   
  
 ZOLOFT 25 mg tablet Generic drug:  sertraline Your last dose was: Your next dose is:    
   
   
 Dose:  25 mg Take 25 mg by mouth daily. Refills:  0 Discharge Instructions RESPIRATORY CARE - BRONCHOSCOPY - DISCHARGE INSTRUCTIONS You received a lot of numbing medication for your throat and nose, and you also received medication to make you sleepy during your procedure. Because of this and because the bronchoscopy may have irritated your airways, we ask that you follow these directions: 1. Do not eat or drink until  3:30 . After that, you may have what you please.   
            You may want to try some liquids first, because your throat may be a little sore. 2. Medication may cause drowsiness for several hours, therefore, do not drive or  operate machinery for remainder of the day. No alcohol today. 3. You may cough up more mucus than usual and you may see some blood, but this is expected and should subside by the following day. 4. If severe throat irritation, coughing, or bleeding continue, call your doctor. 5.         If you run a fever greater than 102, call [facility] at [phone]. 6.         Dr. Jenni Bryant has asked that you: A. Call the doctor's office at [phone] for problems. Meme Agustin See Dr Christina Polanco on 4/18/17 as previously scheduled. Faby Miller Discharge Medications: 
 
  
 
Any additional instructions:  Motrin or tylenol for fever. Dori Marcelino at [facility] is scheduling an appointment for you to have Pulmonary Function Testing at [address] [place] [address]  [phone]. Please call her with any questions about this appointment. Instructions given to Raji Jones and other family members Instructions given by:   
 
Discharge Orders None Introducing [facility]! Laura Miller introduces [employer] patient portal. Now you can access parts of your medical record, email your doctor's office, and request medication refills online. 1. In your internet browser, go to [email or web address] 2. Click on the First Time User? Click Here link in the Sign In box. You will see the New Member Sign Up page. 3. Enter your [employer] Access Code exactly as it appears below. You will not need to use this code after youve completed the sign-up process. If you do not sign up before the expiration date, you must request a new code. · [employer] Access Code: [email or web address] Expires: 5/4/2017  1:54 PM 
 
4. Enter the last four digits of your Social Security Number (xxxx) and Date of Birth (mm/dd/yyyy) as indicated and click Submit. You will be taken to the next sign-up page. 5. Create a [employer] ID. This will be your [employer] login ID and cannot be changed, so think of one that is secure and easy to remember. 6. Create a [employer] password. You can change your password at any time. 7. Enter your Password Reset Question and Answer. This can be used at a later time if you forget your password. 8. Enter your e-mail address. You will receive e-mail notification when new information is available in [address] 9. Click Sign Up. You can now view and download portions of your medical record. 10. Click the Download Summary menu link to download a portable copy of your medical information. If you have questions, please visit the Frequently Asked Questions section of the [employer] website. Remember, [employer] is NOT to be used for urgent needs. For medical emergencies, dial 911. Now available from your iPhone and Android! General Information Please provide this summary of care documentation to your next provider. Patient Signature:  ____________________________________________________________ Date:  ____________________________________________________________  
  
Fadi Nim Provider Signature:  ____________________________________________________________ Date:  ____________________________________________________________

## 2017-04-12 NOTE — ROUTINE PROCESS
Pt's wife felt nauseous and vomited once. Pt was advised to go to ER if she doesn't feel well. Pt's wife opted not to go. Pt ready for discharge. Oxygen saturation is 94% on room air at this time. IV discontinued and skin c/d/i. Discharge instructions given to wife.

## 2017-04-12 NOTE — H&P (VIEW-ONLY)
Outpatient Consult Note    Data Source: Patient, [facility] record. 3/15/2017  3:56 PM      Paresh De Jesus [id number]    67 y.o. Patient Encounter: [facility] Note    Reason for consult:  Lung nodule      HPI:  67 M, retired , excellent performance status ECOG 0, h/o Alzheimers/dementia (on Namenda/depakote/trazadone/sertoline), TIA in 2016, syncope in 2015, colectomy after bowel rupture in 2008. Ex-smoker (over 50 pack year), occasional alcohol use. Was recently admitted from 2/14/17-2/18/17 for planned TURP due to BPH symptoms. He underwent procedure on 2/14/17, needing TBI post-op. Hospital records mention episodes of confusion during post-op period. Additionally he was found to be hypoxic post-op leading to a CT chest on 2/16/17 which showed right upper lobe spiculated 1.9 cm x 1.2 cm nodule suspicious for malignancy. In addition, RLL 5 mm indeterminate nodule. Final pathology from TURP did reveal shima 3+3=6 prostate adenocarcinoma in 13 of 173 prostate chips. He has since been discharged, is off oxygen, and is mentating well. He underwent PET scan at outside facility on 2/26/17 with final impression mentioning FDG activity in RUL lesion, described as a lobular solid mass versus 3 adjacent solid nodules. The degree of activity however is described as non-specific range (SUV 2.5). There is also mention of FDG activity in the CANDELARIO (SUV 2.7) corrresponding with a focal ill defined ground glass opacity measuring up to 15 mm. The well-defined 5 mm solid nodule in RLL has a maximum SUV of 0.62 only. Of note, patient was seen by pulmonology in house and is scheduled to see them as an outpatient with results of the PET scan.      Past Medical History:   Diagnosis Date    Adverse effect of anesthesia     confusion-x 3 days after surgery-2015    Hypotension     Insomnia     Metabolic encephalopathy     Pneumonia     SIRS (systemic inflammatory response syndrome) ([facility] [address]     Stroke ([facility]     [id number]- no residual    Syncope 2015    has- implanted cardiac monitor         Past Surgical History:   Procedure Laterality Date    CARDIAC SURG PROCEDURE UNLIST      implanted heart monitor    HX CHOLECYSTECTOMY      HX COLECTOMY      colostomy with reversal    HX HERNIA REPAIR Left          Current Outpatient Prescriptions   Medication Sig Dispense Refill    albuterol sulfate (PROAIR RESPICLICK) 90 mcg/actuation aepb Take 2 Puffs by inhalation every six (6) hours as needed. 1 Inhaler 1    divalproex DR (DEPAKOTE) 250 mg tablet Take 250 mg by mouth two (2) times a day.  memantine (NAMENDA) 10 mg tablet Take 10 mg by mouth two (2) times a day.  sertraline (ZOLOFT) 25 mg tablet Take 25 mg by mouth daily.  traZODone (DESYREL) 50 mg tablet Take  by mouth nightly. Social History     Social History    Marital status:      Spouse name: N/A    Number of children: N/A    Years of education: N/A     Occupational History    retired       Social History Main Topics    Smoking status: Former Smoker     Packs/day: 1.00     Years: 50.00     Quit date: 2/9/2014    Smokeless tobacco: Never Used      Comment: quit around 2014    Alcohol use Yes      Comment: occasional    Drug use: None    Sexual activity: Not Asked     Other Topics Concern    None     Social History Narrative    . Lives with wife         Family History   Problem Relation Age of Onset    Heart Disease Mother     Cancer Father      lung    Stroke Sister          REVIEW OF SYSTEMS:  As mentioned above, all other systems were reviewed in full and are negative. Allergies   Allergen Reactions    Pcn [Penicillins] Other (comments)           PHYSICAL EXAMINATION:  General Appearance: Healthy appearing patient in no acute distress  Vitals were reviewed.   Visit Vitals    /51  Comment: standing    Pulse 74    Temp 98.7 °F (37.1 °C) (Oral)    Resp 18    Ht 5' 3.5\" (1.613 m)    Wt 160 lb 7 oz (72.8 kg)    SpO2 95%    BMI 27.97 kg/m2     HEENT: No oral or pharyngeal masses, ulceration or thrush noted, no sinus tenderness. Neck is supple with no thyromegaly or JVD noted. Lymph Nodes: No lymphadenopathy noted in the occipital, pre and post auricular, cervical, supra and infraclavicular, axillary, epitrochlear, inguinal, and popliteal region. Breasts: No palpable masses, nipple discharge or skin retraction  Lungs/Thorax: Clear to auscultation, no accessory muscles of respiration being used. Heart: Regular rate and rhythm, normal S1, S2, no appreciable murmurs, rubs, gallops  Abdomen: Soft, nontender, bowel sounds present, no appreciable hepatosplenomegaly, no palpable masses  Extremeties: Good pulses bilaterally, no peripheral edema. Skin: Normal skin tone with no rash, petechiae, ecchymosis noted. Musculoskeletal: No pain on palpation over bony prominence, no edema, no evidence of gout, no joint or bony deformity  Neurologic: Grossly intact    LABS/IMAGING:    Lab Results   Component Value Date/Time    WBC 14.3 02/15/2017 04:50 PM    HGB 9.5 02/15/2017 04:50 PM    HCT 28.9 02/15/2017 04:50 PM    PLATELET 701 73/35/2655 04:50 PM    MCV 92.9 02/15/2017 04:50 PM       Lab Results   Component Value Date/Time    Sodium 142 02/16/2017 03:27 PM    Potassium 4.2 02/16/2017 03:27 PM    Chloride 103 02/16/2017 03:27 PM    CO2 32 02/16/2017 03:27 PM    Anion gap 7 02/16/2017 03:27 PM    Glucose 102 02/16/2017 03:27 PM    BUN 17 02/16/2017 03:27 PM    Creatinine 1.24 02/16/2017 03:27 PM    GFR est AA >60 02/16/2017 03:27 PM    GFR est non-AA >60 02/16/2017 03:27 PM    Calcium 8.0 02/16/2017 03:27 PM    AST (SGOT) 23 02/16/2017 03:27 PM    Alk.  phosphatase 65 02/16/2017 03:27 PM    Protein, total 5.9 02/16/2017 03:27 PM    Albumin 2.8 02/16/2017 03:27 PM    Globulin 3.1 02/16/2017 03:27 PM    A-G Ratio 0.9 02/16/2017 03:27 PM    ALT (SGPT) 11 02/16/2017 03:27 PM           Above results reviewed with patient     ASSESSMENT:  67 M, retired , excellent performance status ECOG 0, h/o Alzheimers/dementia (on Namenda/depakote/trazadone/sertoline), TIA in 2016, syncope in 2015, colectomy after bowel rupture in 2008, ex-smoker (over 50 pack year), occasional alcohol use, was recently admitted from 2/14/17-2/18/17 for planned TURP related to BPH. He underwent procedure on 2/14/17, needing TBI post-op. Hospital records mention episodes of confusion during post-op period. Additionally he was found to be hypoxic post-op leading to a CT chest on 2/16/17 which showed right upper lobe spiculated 1.9 cm x 1.2 cm nodule suspicious for malignancy. In addition, RLL 5 mm indeterminate nodule. Final pathology from TURP did reveal shima 3+3=6 prostate adenocarcinoma in 13 of 173 prostate chips. He has since been discharged, is off oxygen, and is mentating well. He underwent PET scan at outside facility on 2/26/17 with final impression mentioning FDG activity in RUL lesion, described as a lobular solid mass versus 3 adjacent solid nodules. The degree of activity however is described as non-specific range (SUV 2.5). There is also mention of FDG activity in the CANDELARIO (SUV 2.7) corrresponding with a focal ill defined ground glass opacity measuring up to 15 mm. The well-defined 5 mm solid nodule in RLL has a maximum SUV of 0.62 only. Also mention of fairly prominent Lt base of nose, as well as possible submandibular node. Of note, patient was seen by pulmonology in house and is scheduled to see them as an outpatient with results of the PET scan for possible bronchoscopy.      In summary, 67 male history of Alzheimers dementia, on multiple medications for this, significant prior smoking history w recent TURP w path showing shima 6 prostate cancer (13 of 173 chips) s/p TURP, now with RUL lesion with modest FDG uptake, a well defined smaller RLL non-FDG avid lesion 5 mm in size , as well as a CANDELARIO groundglass opacity with minimal FDG uptake. Will initiate workup as follows    PLAN:  - routine labs, PSA  - obtain outside PET for internal uptake and review  - continue following with pulmonology, may need navigational bronch with biopsy: will defer to pulmonology. Will also be presented at pulmonary tumor board  - May require ENT eval also given PET findings - await internal uploading of images    RTC after seeing pulmonologist in 3 weeks time. Will hopefully also have PET scan images uploaded by that time. Thank you Dr Jackie Farnsworth & Dr India Colunga for allowing us to paticipate in care of this pleasant patient. Please call w/ any quesions    I spent over 30 minutes with the patient out of which more than 22 were spent in face to face counseling.     James Mayorga MD  [address]  Office : [phone]  Fax : [phone]

## 2017-04-12 NOTE — INTERVAL H&P NOTE
H&P Update:  Vi Georges was seen and examined. History and physical has been reviewed. The patient has been examined.  There have been no significant clinical changes since the completion of the originally dated History and Physical.    Signed By: Mariam Bhatti MD     April 12, 2017 2:59 PM

## 2017-04-12 NOTE — PROCEDURES
PROCEDURE  Bronchoscopy with Endobronchial Ultrasound Guided Fine Needle Aspiration Of Medistinal Lymph nodes and airway inspection and EMN aided biopsy of peripheral lung nodule. EQUIPEMENT:  Olympus T180 bronchoscope  Super Dimension EMN system  180 deg steareble sheath  Olympus PA431C EBUS scope  UM 17 Radial probe  Super D forceps  Super D 21G needle  Super D triple  needle brush  Fluoroscopy    INDICATION   Peripheral nodule/mass suspicious for malignancy/staging of presumed malignancy        POST OP DIAGNOSIS:  Super Dimension EMN system was employed to identify and biopsy the  nodule/mass seen on CT/PET. 10 forceps, 1 triple needle brush and 2 FNA biopsies were obtained and evaluated via touch prep and KAREN. Touch preps revealed atypical cells but were non diagnostic for malignancy. BAL was obtained and sent for cultures    Histology from obtained tissue is currently pending. Following EMN procedure, linear EBUS was performed for mediastinal staging. Stations 9,6N and 11Rs, were biopsied and negative for malignancy on KAREN. Based on CT chest/PET CT / and EBUS pt would a STAGE[T1b,N0,M0] IA lung cancer if confirmed- CPFT will be performed. This could be metastatic disease from prostrate cancer however. The significance of the smaller round nodule is not clear. Will discuss in TB    ANESTHESIA  Concious sedation with: Fentanyl 200 mcg IV; Versed 3 mg IV; Lidocaine 220 mg to tracheo-bronchial tree and vocal cords; Phenergan 6.25 mg IV  for nausea and vomiting prophylaxis. AIRWAY INSPECTION  After obtaining informed consent, using a bite block, an Olympus Q 180 viedeo bronchoscope was  introduced into the trachea through the vocal cords without complication.     RIGHT  LOCATION NORM/ABNORMAL DESCRIPTION   VOCAL CORDS NL    TRACHEA NL    MAYLIN NL    RMSB NL    RUL NL    BI NL    RML NL    SUP SEGM RLL NL    MED BASAL NL    ANTERIOR BASAL NL    LATERAL BASAL NL    POSTERIOR BASAL NL LEFT  LOCATION NORMAL/ABNORMAL TYPE   LMSB NL    CANDELARIO NL    LINGULA NL    SUPERIOR DIVISION NL    SUPERIOR SEG LLL NL    HUGH-MEDIAL LLL NL    LATERAL LLL NL    POSTERIOR LLL NL                         EBUS  After completing the airway inspection an Olympus  F EBUS bronchoscope was introduced into the trachea through the vocal chords without complication. The balloon was inflated with saline and a mediastinal inspection commenced:      STATION SIZE IN CM   11R sup 0.5/0.5   11R inf No tgt   10R No tgt   7 0.7/0.6   4R 0.9/0.6   11L No tgt   10L No tgt   4L No tgt   2L No tgt   2R No tgt         After identifying targets the following samples were obtained:    STATION PASS# LYMPHOCYTES MALIGNANT ATYPICAL GRANULOMA NONDGNSTC   7 1 + - - -     2 + - - -     3 - - - - BE            4R 1 - - - - BE    2 + - - -     3 - - - - BE            11R 1 - - - - BE    2 - - - -     3 - - - - BE                Olympus T 180   bronchoscope wasintroduced into the airways to identify and biopsy the RULnodule with the aid of Super D EMN system and radial probe US. CT image was acquired on with Super D protocol were used and the pathway to target  planned using super D planning software. Planing data was then used to navigate to the nodule, after verification with radial US. 10 forceps, 1 triple needle brush and 2 FNA biopsies were obtained and evaluated via touch prep and KAREN. Touch preps revealed atypical cells but were non diagnostic for malignancy. BAL was obtained and sent for cultures    Histology from obtained tissue is currently pending.     TOUCH PREP BIOPSY# MALIGNANT SUSPICIOUS ATYPIA NONDGNSTC   RUL nodule 1 - - +     2 - - +     3 - - +     4 - - - BE    5 - - - BE and blood    6 - - - BE and macros    7 - - - BE    8 - - - BE    9 - - - Blood and macros    10 - - - blood   RUL nodule FNA 1 - - - blood    2 - - - blood   RUL Triple needle brush 1 -- -- -- In toto for cytology           RUL nodule BAL 1 -- -- -- In toto for cultures afb/fungus             EBL: 10 ml    Complications: none with no evidence of PTX on post op chest fluoroscopy    Yanique Florez MD.

## 2017-04-14 LAB
BACTERIA SPEC CULT: NORMAL
GRAM STN SPEC: NORMAL
SERVICE CMNT-IMP: NORMAL

## 2017-04-18 LAB
FUNGUS CULTURE, RFCO2T: POSITIVE
FUNGUS SMEAR, RFCO1T: ABNORMAL
FUNGUS SPEC CULT: NORMAL
FUNGUS STAIN, 188244: NORMAL
REFLEX TO ID, RFCO3T: ABNORMAL
SPECIMEN SOURCE: ABNORMAL
SPECIMEN SOURCE: NORMAL

## 2017-04-20 LAB
FUNGUS ID 1, (DID), RFIM1T: NORMAL
SPECIMEN SOURCE: NORMAL

## 2017-05-26 LAB
ACID FAST STN SPEC: NEGATIVE
MYCOBACTERIUM SPEC QL CULT: NEGATIVE
SPECIMEN PREPARATION: NORMAL
SPECIMEN SOURCE: NORMAL

## 2017-07-06 ENCOUNTER — HOSPITAL ENCOUNTER (OUTPATIENT)
Dept: PET IMAGING | Age: 72
Discharge: HOME | End: 2017-07-06
Payer: MEDICARE

## 2017-07-06 DIAGNOSIS — R91.1 PULMONARY NODULE: ICD-10-CM

## 2017-07-06 PROCEDURE — 74011636320 HC RX REV CODE- 636/320: Performed by: INTERNAL MEDICINE

## 2017-07-06 RX ADMIN — DIATRIZOATE MEGLUMINE AND DIATRIZOATE SODIUM 10 ML: 660; 100 LIQUID ORAL; RECTAL at 14:16

## 2017-07-11 ENCOUNTER — HOSPITAL ENCOUNTER (OUTPATIENT)
Dept: PET IMAGING | Age: 72
Discharge: HOME | End: 2017-07-11
Payer: MEDICARE

## 2017-07-11 ENCOUNTER — HOSPITAL ENCOUNTER (OUTPATIENT)
Dept: LAB | Age: 72
Discharge: HOME | End: 2017-07-11
Payer: MEDICARE

## 2017-07-11 DIAGNOSIS — J98.4 PULMONARY LESION: ICD-10-CM

## 2017-07-11 LAB
ALBUMIN SERPL BCP-MCNC: 3.9 G/DL (ref 3.2–4.6)
ALBUMIN/GLOB SERPL: 0.9 {RATIO} (ref 1.2–3.5)
ALP SERPL-CCNC: 139 U/L (ref 50–136)
ALT SERPL-CCNC: 15 U/L (ref 12–65)
ANION GAP BLD CALC-SCNC: 6 MMOL/L (ref 7–16)
AST SERPL W P-5'-P-CCNC: 21 U/L (ref 15–37)
BASOPHILS # BLD AUTO: 0.1 K/UL (ref 0–0.2)
BASOPHILS # BLD: 2 % (ref 0–2)
BILIRUB SERPL-MCNC: 0.6 MG/DL (ref 0.2–1.1)
BUN SERPL-MCNC: 9 MG/DL (ref 8–23)
CALCIUM SERPL-MCNC: 9 MG/DL (ref 8.3–10.4)
CHLORIDE SERPL-SCNC: 99 MMOL/L (ref 98–107)
CO2 SERPL-SCNC: 32 MMOL/L (ref 21–32)
CREAT SERPL-MCNC: 1.25 MG/DL (ref 0.8–1.5)
DIFFERENTIAL METHOD BLD: ABNORMAL
EOSINOPHIL # BLD: 0.3 K/UL (ref 0–0.8)
EOSINOPHIL NFR BLD: 4 % (ref 0.5–7.8)
ERYTHROCYTE [DISTWIDTH] IN BLOOD BY AUTOMATED COUNT: 19.3 % (ref 11.9–14.6)
GLOBULIN SER CALC-MCNC: 4.4 G/DL (ref 2.3–3.5)
GLUCOSE SERPL-MCNC: 107 MG/DL (ref 65–100)
HCT VFR BLD AUTO: 45.3 % (ref 41.1–50.3)
HGB BLD-MCNC: 14.7 G/DL (ref 13.6–17.2)
LYMPHOCYTES # BLD AUTO: 21 % (ref 13–44)
LYMPHOCYTES # BLD: 1.8 K/UL (ref 0.5–4.6)
MCH RBC QN AUTO: 25.7 PG (ref 26.1–32.9)
MCHC RBC AUTO-ENTMCNC: 32.5 G/DL (ref 31.4–35)
MCV RBC AUTO: 79.1 FL (ref 79.6–97.8)
MONOCYTES # BLD: 0.8 K/UL (ref 0.1–1.3)
MONOCYTES NFR BLD AUTO: 9 % (ref 4–12)
NEUTS SEG # BLD: 5.5 K/UL (ref 1.7–8.2)
NEUTS SEG NFR BLD AUTO: 64 % (ref 43–78)
NRBC # BLD: 0 K/UL (ref 0–0.2)
PLATELET # BLD AUTO: 179 K/UL (ref 150–450)
PMV BLD AUTO: 10.4 FL (ref 10.8–14.1)
POTASSIUM SERPL-SCNC: 4.2 MMOL/L (ref 3.5–5.1)
PROT SERPL-MCNC: 8.3 G/DL (ref 6.3–8.2)
RBC # BLD AUTO: 5.73 M/UL (ref 4.23–5.67)
SODIUM SERPL-SCNC: 137 MMOL/L (ref 136–145)
WBC # BLD AUTO: 8.6 K/UL (ref 4.3–11.1)

## 2017-07-11 PROCEDURE — A9552 F18 FDG: HCPCS

## 2017-07-11 PROCEDURE — 74011636320 HC RX REV CODE- 636/320: Performed by: INTERNAL MEDICINE

## 2017-07-11 RX ORDER — SODIUM CHLORIDE 0.9 % (FLUSH) 0.9 %
10 SYRINGE (ML) INJECTION
Status: COMPLETED | OUTPATIENT
Start: 2017-07-11 | End: 2017-07-11

## 2017-07-11 RX ADMIN — Medication 10 ML: at 09:24

## 2017-07-11 RX ADMIN — DIATRIZOATE MEGLUMINE AND DIATRIZOATE SODIUM 10 ML: 660; 100 LIQUID ORAL; RECTAL at 09:24

## 2017-11-28 ENCOUNTER — HOSPITAL ENCOUNTER (OUTPATIENT)
Dept: CT IMAGING | Age: 72
Discharge: HOME | End: 2017-11-28
Attending: INTERNAL MEDICINE
Payer: MEDICARE

## 2017-11-28 ENCOUNTER — HOSPITAL ENCOUNTER (OUTPATIENT)
Dept: LAB | Age: 72
Discharge: HOME | End: 2017-11-28
Payer: MEDICARE

## 2017-11-28 VITALS — BODY MASS INDEX: 25.07 KG/M2 | HEIGHT: 66 IN | WEIGHT: 156 LBS

## 2017-11-28 DIAGNOSIS — R91.1 PULMONARY NODULE: ICD-10-CM

## 2017-11-28 LAB
ALBUMIN SERPL-MCNC: 3.5 G/DL (ref 3.2–4.6)
ALBUMIN/GLOB SERPL: 0.9 {RATIO} (ref 1.2–3.5)
ALP SERPL-CCNC: 159 U/L (ref 50–136)
ALT SERPL-CCNC: 23 U/L (ref 12–65)
ANION GAP SERPL CALC-SCNC: 8 MMOL/L (ref 7–16)
AST SERPL-CCNC: 17 U/L (ref 15–37)
BASOPHILS # BLD: 0.1 K/UL (ref 0–0.2)
BASOPHILS NFR BLD: 1 % (ref 0–2)
BILIRUB SERPL-MCNC: 0.2 MG/DL (ref 0.2–1.1)
BUN SERPL-MCNC: 13 MG/DL (ref 8–23)
CALCIUM SERPL-MCNC: 8.3 MG/DL (ref 8.3–10.4)
CHLORIDE SERPL-SCNC: 102 MMOL/L (ref 98–107)
CO2 SERPL-SCNC: 30 MMOL/L (ref 21–32)
CREAT BLD-MCNC: 1.1 MG/DL (ref 0.8–1.5)
CREAT SERPL-MCNC: 1.21 MG/DL (ref 0.8–1.5)
DIFFERENTIAL METHOD BLD: ABNORMAL
EOSINOPHIL # BLD: 0.3 K/UL (ref 0–0.8)
EOSINOPHIL NFR BLD: 5 % (ref 0.5–7.8)
ERYTHROCYTE [DISTWIDTH] IN BLOOD BY AUTOMATED COUNT: 15.9 % (ref 11.9–14.6)
GLOBULIN SER CALC-MCNC: 3.9 G/DL (ref 2.3–3.5)
GLUCOSE SERPL-MCNC: 122 MG/DL (ref 65–100)
HCT VFR BLD AUTO: 41.9 % (ref 41.1–50.3)
HGB BLD-MCNC: 13.5 G/DL (ref 13.6–17.2)
LYMPHOCYTES # BLD: 1.5 K/UL (ref 0.5–4.6)
LYMPHOCYTES NFR BLD: 21 % (ref 13–44)
MCH RBC QN AUTO: 26.9 PG (ref 26.1–32.9)
MCHC RBC AUTO-ENTMCNC: 32.2 G/DL (ref 31.4–35)
MCV RBC AUTO: 83.6 FL (ref 79.6–97.8)
MONOCYTES # BLD: 0.5 K/UL (ref 0.1–1.3)
MONOCYTES NFR BLD: 8 % (ref 4–12)
NEUTS SEG # BLD: 4.5 K/UL (ref 1.7–8.2)
NEUTS SEG NFR BLD: 65 % (ref 43–78)
NRBC # BLD: 0 K/UL (ref 0–0.2)
PLATELET # BLD AUTO: 144 K/UL (ref 150–450)
PMV BLD AUTO: 10.9 FL (ref 10.8–14.1)
POTASSIUM SERPL-SCNC: 3.6 MMOL/L (ref 3.5–5.1)
PROT SERPL-MCNC: 7.4 G/DL (ref 6.3–8.2)
RBC # BLD AUTO: 5.01 M/UL (ref 4.23–5.67)
SODIUM SERPL-SCNC: 140 MMOL/L (ref 136–145)
WBC # BLD AUTO: 6.9 K/UL (ref 4.3–11.1)

## 2017-11-28 PROCEDURE — 74011636320 HC RX REV CODE- 636/320: Performed by: INTERNAL MEDICINE

## 2017-11-28 PROCEDURE — 71260 CT THORAX DX C+: CPT

## 2017-11-28 PROCEDURE — 74011000258 HC RX REV CODE- 258: Performed by: INTERNAL MEDICINE

## 2017-11-28 PROCEDURE — 82565 ASSAY OF CREATININE: CPT

## 2017-11-28 RX ORDER — SODIUM CHLORIDE 0.9 % (FLUSH) 0.9 %
10 SYRINGE (ML) INJECTION
Status: COMPLETED | OUTPATIENT
Start: 2017-11-28 | End: 2017-11-28

## 2017-11-28 RX ADMIN — SODIUM CHLORIDE 100 ML: 900 INJECTION, SOLUTION INTRAVENOUS at 14:16

## 2017-11-28 RX ADMIN — Medication 10 ML: at 14:17

## 2017-11-28 RX ADMIN — IOPAMIDOL 80 ML: 755 INJECTION, SOLUTION INTRAVENOUS at 14:17

## 2018-04-02 ENCOUNTER — HOSPITAL ENCOUNTER (OUTPATIENT)
Dept: LAB | Age: 73
Discharge: HOME | End: 2018-04-02
Payer: MEDICARE

## 2018-04-02 ENCOUNTER — HOSPITAL ENCOUNTER (OUTPATIENT)
Dept: CT IMAGING | Age: 73
Discharge: HOME | End: 2018-04-02
Attending: INTERNAL MEDICINE
Payer: MEDICARE

## 2018-04-02 DIAGNOSIS — R91.1 PULMONARY NODULE: ICD-10-CM

## 2018-04-02 DIAGNOSIS — J98.4 PULMONARY LESION: ICD-10-CM

## 2018-04-02 LAB
ALBUMIN SERPL-MCNC: 3.3 G/DL (ref 3.2–4.6)
ALBUMIN/GLOB SERPL: 0.9 {RATIO} (ref 1.2–3.5)
ALP SERPL-CCNC: 133 U/L (ref 50–136)
ALT SERPL-CCNC: 16 U/L (ref 12–65)
ANION GAP SERPL CALC-SCNC: 5 MMOL/L (ref 7–16)
AST SERPL-CCNC: 18 U/L (ref 15–37)
BASOPHILS # BLD: 0.1 K/UL (ref 0–0.2)
BASOPHILS NFR BLD: 2 % (ref 0–2)
BILIRUB SERPL-MCNC: 0.3 MG/DL (ref 0.2–1.1)
BUN SERPL-MCNC: 10 MG/DL (ref 8–23)
CALCIUM SERPL-MCNC: 8.5 MG/DL (ref 8.3–10.4)
CHLORIDE SERPL-SCNC: 104 MMOL/L (ref 98–107)
CO2 SERPL-SCNC: 32 MMOL/L (ref 21–32)
CREAT SERPL-MCNC: 1.14 MG/DL (ref 0.8–1.5)
DIFFERENTIAL METHOD BLD: ABNORMAL
EOSINOPHIL # BLD: 0.3 K/UL (ref 0–0.8)
EOSINOPHIL NFR BLD: 3 % (ref 0.5–7.8)
ERYTHROCYTE [DISTWIDTH] IN BLOOD BY AUTOMATED COUNT: 14.9 % (ref 11.9–14.6)
GLOBULIN SER CALC-MCNC: 3.8 G/DL (ref 2.3–3.5)
GLUCOSE SERPL-MCNC: 81 MG/DL (ref 65–100)
HCT VFR BLD AUTO: 39.8 % (ref 41.1–50.3)
HGB BLD-MCNC: 13.2 G/DL (ref 13.6–17.2)
LYMPHOCYTES # BLD: 1.6 K/UL (ref 0.5–4.6)
LYMPHOCYTES NFR BLD: 21 % (ref 13–44)
MCH RBC QN AUTO: 28.2 PG (ref 26.1–32.9)
MCHC RBC AUTO-ENTMCNC: 33.2 G/DL (ref 31.4–35)
MCV RBC AUTO: 85 FL (ref 79.6–97.8)
MONOCYTES # BLD: 0.7 K/UL (ref 0.1–1.3)
MONOCYTES NFR BLD: 9 % (ref 4–12)
NEUTS SEG # BLD: 4.9 K/UL (ref 1.7–8.2)
NEUTS SEG NFR BLD: 66 % (ref 43–78)
NRBC # BLD: 0 K/UL (ref 0–0.2)
PLATELET # BLD AUTO: 161 K/UL (ref 150–450)
PMV BLD AUTO: 11.4 FL (ref 10.8–14.1)
POTASSIUM SERPL-SCNC: 3.7 MMOL/L (ref 3.5–5.1)
PROT SERPL-MCNC: 7.1 G/DL (ref 6.3–8.2)
RBC # BLD AUTO: 4.68 M/UL (ref 4.23–5.67)
SODIUM SERPL-SCNC: 141 MMOL/L (ref 136–145)
WBC # BLD AUTO: 7.5 K/UL (ref 4.3–11.1)

## 2018-04-02 PROCEDURE — 71250 CT THORAX DX C-: CPT

## 2018-04-02 PROCEDURE — 80053 COMPREHEN METABOLIC PANEL: CPT | Performed by: INTERNAL MEDICINE

## 2018-04-02 PROCEDURE — 36415 COLL VENOUS BLD VENIPUNCTURE: CPT | Performed by: INTERNAL MEDICINE

## 2018-04-02 PROCEDURE — 85025 COMPLETE CBC W/AUTO DIFF WBC: CPT | Performed by: INTERNAL MEDICINE

## 2018-04-24 ENCOUNTER — HOSPITAL ENCOUNTER (OUTPATIENT)
Dept: PET IMAGING | Age: 73
Discharge: HOME | End: 2018-04-24
Payer: MEDICARE

## 2018-04-24 DIAGNOSIS — C34.12 MALIGNANT NEOPLASM OF UPPER LOBE OF LEFT LUNG (HCC): ICD-10-CM

## 2018-04-24 PROCEDURE — A9552 F18 FDG: HCPCS

## 2018-04-24 PROCEDURE — 74011636320 HC RX REV CODE- 636/320: Performed by: INTERNAL MEDICINE

## 2018-04-24 RX ORDER — SODIUM CHLORIDE 0.9 % (FLUSH) 0.9 %
10 SYRINGE (ML) INJECTION
Status: COMPLETED | OUTPATIENT
Start: 2018-04-24 | End: 2018-04-24

## 2018-04-24 RX ADMIN — DIATRIZOATE MEGLUMINE AND DIATRIZOATE SODIUM 10 ML: 660; 100 LIQUID ORAL; RECTAL at 14:06

## 2018-04-24 RX ADMIN — Medication 10 ML: at 14:06

## 2018-04-25 ENCOUNTER — HOSPITAL ENCOUNTER (OUTPATIENT)
Dept: RADIATION ONCOLOGY | Age: 73
Discharge: HOME | End: 2018-04-25
Payer: MEDICARE

## 2018-04-25 PROCEDURE — 99211 OFF/OP EST MAY X REQ PHY/QHP: CPT

## 2018-04-25 NOTE — CONSULTS
Patient: Domenico Hernandez. MRN: [id number]  SSN: [id number]    YOB: 1945  Age: 75 y.o. Sex: male      Other Providers:  Enma Mann MD    CHIEF COMPLAINT: Lung cancer    DIAGNOSIS: Probable T1 N0 left lower lobe lung cancer      HISTORY OF PRESENT ILLNESS:  Domenico Hernandez. is a 75 y.o. male who I am seeing at the request of Dr. Momo Lowery for lung cancer. tthe patient has medical history significant for Alzheimer's/dementia, TIA in 2016, syncopal episodes, colectomy after bowel rupture in 2008,  former smoker of >50 pack years. However, he has overall good performance status. He was admitted from 02/14/17 through 02/18/17 for a planned TURP secondary to BPH. He underwent this procedure on 02/14/17. Following procedure he had episodes of confusion. He was found to be hypoxic postoperatively. This led to a chest CT on 02/16/17 that showed a right upper lobe spiculated nodule measuring 1.9 x 1.2 cm that was suspicious for malignancy. In addition, a right lower lobe 5 mm indeterminate nodule was seen. Pathology from the TURP revealed Greg 3+3 = 6 adenocarcinoma and 13/173 prostate chips. PET/CT at an outside facility on 02/26/17 showed FDG activity (max SUV 2.5) in the right upper lobe lesion, described as a lobular solid mass versus 3 adjacent solid nodules. SUV max of the left upper lobe 15 mm nodule was 2.7. The well-defined 5 mm solid nodule in the right lower lobe has a maximum SUV of 0.62. There was also a fairly prominent area of uptake in the left base of the nose, as well as possible submandibular node. Navigation bronchoscopy in 04/17 by Dr. Abrahan Butler  yielded benign pathology from the right upper lobe nodule. Repeat CT scans showed decreased size of this nodule. CT scan of the chest on 04/02/18 identified an enlarging spiculated 1.3 cm right lower lobe nodule (previously 0.9 cm) suspicious for malignancy. Stable right upper lobe opacities were noted.  PET/CT scan on 04/24/18 showed an enlarging right lower lobe nodule clearly FDG avid with an SUV max of 2.7 felt to be suspicious for urinary new primary lung cancer or metastasis. He has been sent for navigation bronchoscopy. At this time, Mr. Kandy Beck continues to have significant debility from Alzheimer's. His wife answers most questions for him. She notes that he is out of breath easily with exertion. Otherwise she is doing well. He has occasional cough is productive of clear/white sputum. His appetite is moderate. His weight is stable. PAST MEDICAL HISTORY:    Past Medical History:   Diagnosis Date    Adverse effect of anesthesia     confusion-x 3 days after surgery-2015    Cancer [facility])     prostate    Chronic obstructive pulmonary disease ([facility] [address]     Hypotension     Insomnia     Metabolic encephalopathy     Pneumonia     Psychiatric disorder     SIRS (systemic inflammatory response syndrome) ([facility] [address]     Stroke ([facility] [address]     tia-2016- no residual    Syncope 2015    has- implanted cardiac monitor       The patient denies history of collagen vascular diseases, pacemaker insertion, prior radiation or prior chemotherapy. PAST SURGICAL HISTORY:   Past Surgical History:   Procedure Laterality Date    CARDIAC SURG PROCEDURE UNLIST      implanted heart monitor    HX CHOLECYSTECTOMY      HX COLECTOMY      colostomy with reversal    HX HERNIA REPAIR Left     HX ORTHOPAEDIC      knee surgery    HX UROLOGICAL      prostate       MEDICATIONS:     Current Outpatient Prescriptions:     tiotropium-olodaterol (STIOLTO RESPIMAT) 2.5-2.5 mcg/actuation mist, Take 2 Puffs by inhalation daily. , Disp: 1 Inhaler, Rfl: 11    albuterol sulfate (PROAIR RESPICLICK) 90 mcg/actuation aepb, Take 2 Puffs by inhalation every six (6) hours as needed. , Disp: 1 Inhaler, Rfl: 1    divalproex DR (DEPAKOTE) 250 mg tablet, Take 500 mg by mouth two (2) times a day., Disp: , Rfl:     memantine (NAMENDA) 10 mg tablet, Take 10 mg by mouth two (2) times a day., Disp: , Rfl:     sertraline (ZOLOFT) 25 mg tablet, Take 25 mg by mouth daily. , Disp: , Rfl:     traZODone (DESYREL) 50 mg tablet, Take  by mouth nightly., Disp: , Rfl:     ALLERGIES:   Allergies   Allergen Reactions    Pcn [Penicillins] Other (comments)     Unknown as infant       SOCIAL HISTORY:   Social History     Social History    Marital status:      Spouse name: N/A    Number of children: N/A    Years of education: N/A     Occupational History    retired       Social History Main Topics    Smoking status: Former Smoker     Packs/day: 1.00     Years: 50.00     Quit date: 2/9/2014    Smokeless tobacco: Never Used      Comment: quit around 2014    Alcohol use Yes      Comment: occasional    Drug use: Not on file    Sexual activity: Not on file     Other Topics Concern    Not on file     Social History Narrative    . Lives with wife       FAMILY HISTORY:   Family History   Problem Relation Age of Onset    Heart Disease Mother     Cancer Father      lung    Stroke Sister        REVIEW OF SYSTEMS: Please see the completed review of systems sheet in the chart that I have reviewed today. PHYSICAL EXAMINATION:   ECOG Performance status 1  VITAL SIGNS: There were no vitals taken for this visit. GENERAL: The patient is well-developed, ambulatory, alert and in no acute distress. HEENT: Head is normocephalic, atraumatic. Pupils are equal, round and reactive to light and accommodation. Extraocular movement intact. NECK: Neck is supple with no masses. CARDIOVASCULAR: Heart has regular rate and rhythm. There are no murmurs, rubs or gallops. Radial pulses are 2+ RESPIRATORY: Lungs are clear to auscultation and percussion. There is normal respiratory effort. LYMPHATIC: There is no cervical, supraclavicular or axillary lymphadenopathy bilaterally. MUSCULOSKELETAL: Extremities reveal no cyanosis, clubbing or edema.  is 5+/5.  NEURO:  Cranial nerves II-XII grossly intact. Muscular strength and sensation are intact throughout all four extremities. PATHOLOGY:      04/13/17:    DIAGNOSIS   RIGHT UPPER LOBE NODULE BIOPSY:   BENIGN LUNG PARENCHYMA WITH ATELECTASIS, MILD CHRONIC INFLAMMATION AND   REACTIVE PNEUMOCYTE ATYPIA. NEGATIVE FOR MALIGNANCY.     02/14/17:    DIAGNOSIS   PROSTATE, TRANSURETHRAL RESECTION OF PROSTATE TISSUE: PROSTATIC ADENOCARCINOMA, JACQUELINE SCORE 3 + 3 = 6 INVOLVING 13  PROSTATE CHIPS. CYSTITIS CYSTICA. SEE COMMENT. LABORATORY:   Lab Results   Component Value Date/Time    Sodium 141 04/02/2018 02:04 PM    Potassium 3.7 04/02/2018 02:04 PM    Chloride 104 04/02/2018 02:04 PM    CO2 32 04/02/2018 02:04 PM    Anion gap 5 (L) 04/02/2018 02:04 PM    Glucose 81 04/02/2018 02:04 PM    BUN 10 04/02/2018 02:04 PM    Creatinine 1.14 04/02/2018 02:04 PM    GFR est AA >60 04/02/2018 02:04 PM    GFR est non-AA >60 04/02/2018 02:04 PM    Calcium 8.5 04/02/2018 02:04 PM    Albumin 3.3 04/02/2018 02:04 PM    Protein, total 7.1 04/02/2018 02:04 PM    Globulin 3.8 (H) 04/02/2018 02:04 PM    A-G Ratio 0.9 (L) 04/02/2018 02:04 PM    AST (SGOT) 18 04/02/2018 02:04 PM    ALT (SGPT) 16 04/02/2018 02:04 PM     Lab Results   Component Value Date/Time    WBC 7.5 04/02/2018 02:04 PM    HGB 13.2 (L) 04/02/2018 02:04 PM    HCT 39.8 (L) 04/02/2018 02:04 PM    PLATELET 513 25/63/5941 02:04 PM       RADIOLOGY:    Ct Chest Wo Cont    Result Date: 4/2/2018  CT Chest without contrast Clinical Indication:  Follow-up of lung nodules with history including stroke, COPD, prostate cancer, right upper lobe bronchogenic lung carcinoma, former smoker with 50-60 pack year history Comparison:  11/28/2017 Technique: Automated exposure Control was used. Contiguous axial images were obtained from the neck base through the upper abdomen without contrast. Low-dose nodule follow-up protocol.  Findings:  Lung windows again demonstrate upper lobe predominant emphysema. There are mild small peripheral groundglass and linear opacities in the right upper lobe at the site of a previously measured malignant nodule, which is no longer discretely measurable. The spiculated right lower lobe nodule measures up to 1.3 cm (previously 0.9 cm at this level). Tiny benign right middle lobe calcified granuloma again noted. No evidence of a left lung mass. No evidence of pneumothorax, honeycombing, bronchiectasis or consolidation. There is no evidence of developing thoracic lymphadenopathy, pleural or pericardial effusion. Multiple small calcified mediastinal and hilar lymph nodes are again seen. There are scattered atherosclerotic calcifications of the aorta, its branches, and the coronary arteries. Normal caliber aorta and esophagus. Metallic device in the left chest wall may represent a cardiac pacemaker. Limited upper abdominal views demonstrate no adrenal mass or acute abnormality, with cholecystectomy clips again noted. Bone windows demonstrate no acute or aggressive osseous lesions. Impression: 1. Enlarging spiculated 1.3 cm right lower lobe nodule suspicious for malignancy. 2. Stable right upper lobe opacities. Sidumula 60 significant findings in this report have been referred to the Imaging Navigator in order to communicate to the referring provider or his/her designee as outlined in Section II.C.2.a.ii-iii of the ACR Practice Guideline for Communication of Diagnostic Imaging Findings. Pet/ct Tumor Image Skull Thigh (sub)    Result Date: 4/25/2018  Nuclear Medicine Whole Body CT / PET- FDG Study 4/24/2018 3:50 PM INDICATION: Non-small cell lung cancer restaging COMPARISON: Prior studies-most recent: CT chest 4/2/2018. PET/CT 7/11/2017. TECHNIQUE:   Radiopharmaceutical: 14.29 mCi F18-FDG IV. This is a whole-body PET- FDG study performed on a dedicated full- ring scanner.  Low dose, nondiagnostic CT axial source images are also acquired for PET attenuation correction and are utilized for PET-CT fusion with the emission images. The patient is not a diabetic and underwent adequate fasting of at least 4 hours. Blood glucose at the time of tracer administration is 82 mg/dl, which is adequate for imaging. Approximately 60 minutes after administration of the radiopharmaceutical imaging was initiated covering the skull to the thighs. SUV max. Calculated from patient body wt. Noncaloric MDGASTROVIEW dilute oral contrast is given. FINDINGS:  Examination of the 3D tomographic PET images demonstrates no new suspicious hypermetabolic finding intracranially or in the head and neck. The enlarging noncalcified nodule in the right lower lobe-shows increased hypermetabolic activity today. SUV Max for this is 2.7. Posttreatment scarring and emphysema changes in the lung apices are stable and on CT and only shows minimal there is no suspicious hypermetabolic activity identified below the diaphragm in the abdomen and pelvis today. FDG activity-less evident than before. There is no suspicious hypermetabolic hilar or mediastinal lymph node. Below the diaphragm in the abdomen and pelvis in the left lobe of the liver there is a faintly prominent small metabolic focus seen on image 166. This is too small to further characterize on PET. Elsewhere there is normal physiologic FDG activity only. No suspicious skeletal lesion. IMPRESSION: 1. Enlarging right lower lobe nodule now clearly FDG avid-suspicious for either a new primary lung cancer or metastasis. Treated lesion seen previously in the right upper lobe is stable on CT and less FDG avid compatible with resolving post inflammatory change only. 2. There is an indeterminant small metabolic focus in the left lobe of the liver today-too small to characterize well on PET/CT. With the patient's history-recommend either hepatic protocol contrast-enhanced liver MRI or CT for further characterization of this.            IMPRESSION:  Shawn Goss. is a 75 y.o. male with presumed T1 LLL lung cancer. COUNSELING AND COORDINATION OF CARE: I have had a 60 minute consultation with Mr. Katy Asif and his wife of which greater than half has been spent counseling him about management options for his presumed T1 lung cancer. We had a long discussion regarding the natural history and implications of the diagnosis of lung cancer. Prognostic factors including stage, underlying lung function, age and performance status were discussed. Various treatment options including surgery, and definitive radiation therapy were compared and contrasted with regard to outcome and quality of life. We discussed the recent advances in technology, mainly related to stereotactic body radiotherapy, and its ability to deliver higher doses of radiation more focally with higher precision so as to avoid high-grade toxicity. I reviewed some of the recent and pertinent literature in support of SBRT where control rates have been documented in excess of 90%, a substantial improvement over previous reports with conventional fractionation with control rates on the order of 60-70%. I outlined the potential toxicities which are related to the location with central bronchial injury for tumors located in close proximity to the bronchial tree, versus a higher risk of chest wall pain and rib fracture with lesions adjacent to the chest wall. In either situation, the chance of high-grade toxicity is exceedingly low. Fatigue, dysphasia, skin irritation, and low blood counts were among other acute complications discussed. Damage to normal lung, heart, vasculature and esophagus were among the late complications highlighted. However, at the moment he does not have pathological confirmation of lung cancer. I have recommended navigation bronchoscopy with biopsy and placement of fiducial markers for potential CyberKnife use in the future.   If the biopsy yields a diagnosis of NSCLC then I recommend moving forward with CyberKnife SBRT. I would plan to treat to a dose of 54 Gy in 3 fractions using CyberKnife SBRT. I will arrange for the patient to be seen by Dr. Phill Lees for bronchoscopy. The patient will then return for follow-up. I appreciate the opportunity to participate in Mr. Plummer's care.       Norma Nevarez MD   April 25, 2018

## 2018-04-25 NOTE — PROGRESS NOTES
Pt here today for initial RT consult for lung cancer with Dr. Lillie Leyden. The 4/24/18 PET indicated an enlarging RLL nodule. Pt and his wife stated that they live 2 hours away. Per Dr. Laura Carrion, pt would most likely get 3 treatments of Cyberknife at [facility] if he has RT. LMOM at Dr. Daniel Jimenes office in Pulmonary Medicine for call back to refer pt for Navigational Bronchoscopy/Lung Bx/Fiducial Marker placement--waiting to hear back from .

## 2018-04-27 DIAGNOSIS — C34.90 MALIGNANT NEOPLASM OF LUNG, UNSPECIFIED LATERALITY, UNSPECIFIED PART OF LUNG (HCC): Primary | ICD-10-CM

## 2018-05-03 ENCOUNTER — APPOINTMENT (OUTPATIENT)
Dept: GENERAL RADIOLOGY | Age: 73
End: 2018-05-03
Attending: INTERNAL MEDICINE
Payer: MEDICARE

## 2018-05-03 ENCOUNTER — APPOINTMENT (OUTPATIENT)
Dept: CT IMAGING | Age: 73
End: 2018-05-03
Attending: INTERNAL MEDICINE
Payer: MEDICARE

## 2018-05-03 ENCOUNTER — HOSPITAL ENCOUNTER (OUTPATIENT)
Age: 73
Setting detail: OUTPATIENT SURGERY
Discharge: HOME | End: 2018-05-03
Attending: INTERNAL MEDICINE | Admitting: INTERNAL MEDICINE
Payer: MEDICARE

## 2018-05-03 VITALS
HEART RATE: 70 BPM | SYSTOLIC BLOOD PRESSURE: 127 MMHG | TEMPERATURE: 98.5 F | BODY MASS INDEX: 25.39 KG/M2 | DIASTOLIC BLOOD PRESSURE: 60 MMHG | HEIGHT: 66 IN | RESPIRATION RATE: 16 BRPM | WEIGHT: 158 LBS | OXYGEN SATURATION: 94 %

## 2018-05-03 DIAGNOSIS — R91.1 PULMONARY NODULE: ICD-10-CM

## 2018-05-03 DIAGNOSIS — R91.1 LUNG NODULE: ICD-10-CM

## 2018-05-03 PROCEDURE — 31654 BRONCH EBUS IVNTJ PERPH LES: CPT | Performed by: INTERNAL MEDICINE

## 2018-05-03 PROCEDURE — 74011250636 HC RX REV CODE- 250/636: Performed by: INTERNAL MEDICINE

## 2018-05-03 PROCEDURE — 74011000250 HC RX REV CODE- 250: Performed by: INTERNAL MEDICINE

## 2018-05-03 PROCEDURE — 99152 MOD SED SAME PHYS/QHP 5/>YRS: CPT | Performed by: INTERNAL MEDICINE

## 2018-05-03 PROCEDURE — 99153 MOD SED SAME PHYS/QHP EA: CPT | Performed by: INTERNAL MEDICINE

## 2018-05-03 PROCEDURE — 31624 DX BRONCHOSCOPE/LAVAGE: CPT | Performed by: INTERNAL MEDICINE

## 2018-05-03 PROCEDURE — 77030031420 HC NDL TIP BRSH ASP SD SPDM -B: Performed by: INTERNAL MEDICINE

## 2018-05-03 PROCEDURE — 77030021922 HC FCPS BIOP SD DISP SPDM -D: Performed by: INTERNAL MEDICINE

## 2018-05-03 PROCEDURE — 77030028571 HC CATH ENDOBRNCH DISP BIOP KT SPMD -G1: Performed by: INTERNAL MEDICINE

## 2018-05-03 PROCEDURE — 77030012699 HC VLV SUC CNTRL OCOA -A: Performed by: INTERNAL MEDICINE

## 2018-05-03 PROCEDURE — A4648 IMPLANTABLE TISSUE MARKER: HCPCS

## 2018-05-03 PROCEDURE — 31627 NAVIGATIONAL BRONCHOSCOPY: CPT | Performed by: INTERNAL MEDICINE

## 2018-05-03 PROCEDURE — 74011250636 HC RX REV CODE- 250/636

## 2018-05-03 PROCEDURE — 31629 BRONCHOSCOPY/NEEDLE BX EACH: CPT | Performed by: INTERNAL MEDICINE

## 2018-05-03 PROCEDURE — 88305 TISSUE EXAM BY PATHOLOGIST: CPT | Performed by: INTERNAL MEDICINE

## 2018-05-03 PROCEDURE — 31626 BRONCHOSCOPY W/MARKERS: CPT | Performed by: INTERNAL MEDICINE

## 2018-05-03 PROCEDURE — 31628 BRONCHOSCOPY/LUNG BX EACH: CPT | Performed by: INTERNAL MEDICINE

## 2018-05-03 PROCEDURE — 77030031404 HC PTCH SENS SD DISP SPDM -A: Performed by: INTERNAL MEDICINE

## 2018-05-03 PROCEDURE — 88112 CYTOPATH CELL ENHANCE TECH: CPT | Performed by: INTERNAL MEDICINE

## 2018-05-03 PROCEDURE — 31623 DX BRONCHOSCOPE/BRUSH: CPT | Performed by: INTERNAL MEDICINE

## 2018-05-03 PROCEDURE — 76040000026: Performed by: INTERNAL MEDICINE

## 2018-05-03 PROCEDURE — 71250 CT THORAX DX C-: CPT

## 2018-05-03 RX ORDER — FENTANYL CITRATE 50 UG/ML
50 INJECTION, SOLUTION INTRAMUSCULAR; INTRAVENOUS
Status: DISCONTINUED | OUTPATIENT
Start: 2018-05-03 | End: 2018-05-03 | Stop reason: HOSPADM

## 2018-05-03 RX ORDER — LIDOCAINE HYDROCHLORIDE 20 MG/ML
JELLY TOPICAL ONCE
Status: COMPLETED | OUTPATIENT
Start: 2018-05-03 | End: 2018-05-03

## 2018-05-03 RX ORDER — SODIUM CHLORIDE 0.9 % (FLUSH) 0.9 %
5-10 SYRINGE (ML) INJECTION AS NEEDED
Status: DISCONTINUED | OUTPATIENT
Start: 2018-05-03 | End: 2018-05-03 | Stop reason: HOSPADM

## 2018-05-03 RX ORDER — NALOXONE HYDROCHLORIDE 0.4 MG/ML
0.4 INJECTION, SOLUTION INTRAMUSCULAR; INTRAVENOUS; SUBCUTANEOUS
Status: DISCONTINUED | OUTPATIENT
Start: 2018-05-03 | End: 2018-05-03 | Stop reason: HOSPADM

## 2018-05-03 RX ORDER — DIPHENHYDRAMINE HYDROCHLORIDE 50 MG/ML
50 INJECTION, SOLUTION INTRAMUSCULAR; INTRAVENOUS ONCE
Status: DISCONTINUED | OUTPATIENT
Start: 2018-05-03 | End: 2018-05-03 | Stop reason: HOSPADM

## 2018-05-03 RX ORDER — MIDAZOLAM HYDROCHLORIDE 1 MG/ML
.25-5 INJECTION, SOLUTION INTRAMUSCULAR; INTRAVENOUS
Status: DISCONTINUED | OUTPATIENT
Start: 2018-05-03 | End: 2018-05-03 | Stop reason: HOSPADM

## 2018-05-03 RX ORDER — SODIUM CHLORIDE 9 MG/ML
1000 INJECTION, SOLUTION INTRAVENOUS CONTINUOUS
Status: DISCONTINUED | OUTPATIENT
Start: 2018-05-03 | End: 2018-05-03 | Stop reason: HOSPADM

## 2018-05-03 RX ORDER — LIDOCAINE HYDROCHLORIDE 40 MG/ML
SOLUTION TOPICAL ONCE
Status: COMPLETED | OUTPATIENT
Start: 2018-05-03 | End: 2018-05-03

## 2018-05-03 RX ORDER — SODIUM CHLORIDE 0.9 % (FLUSH) 0.9 %
5-10 SYRINGE (ML) INJECTION EVERY 8 HOURS
Status: DISCONTINUED | OUTPATIENT
Start: 2018-05-03 | End: 2018-05-03 | Stop reason: HOSPADM

## 2018-05-03 RX ORDER — FLUMAZENIL 0.1 MG/ML
0.2 INJECTION INTRAVENOUS
Status: DISCONTINUED | OUTPATIENT
Start: 2018-05-03 | End: 2018-05-03 | Stop reason: HOSPADM

## 2018-05-03 RX ADMIN — FENTANYL CITRATE 50 MCG: 50 INJECTION, SOLUTION INTRAMUSCULAR; INTRAVENOUS at 10:00

## 2018-05-03 RX ADMIN — MIDAZOLAM HYDROCHLORIDE 2 MG: 1 INJECTION, SOLUTION INTRAMUSCULAR; INTRAVENOUS at 09:17

## 2018-05-03 RX ADMIN — LIDOCAINE HYDROCHLORIDE: 40 SOLUTION TOPICAL at 09:18

## 2018-05-03 RX ADMIN — FENTANYL CITRATE 50 MCG: 50 INJECTION, SOLUTION INTRAMUSCULAR; INTRAVENOUS at 09:17

## 2018-05-03 RX ADMIN — FENTANYL CITRATE 50 MCG: 50 INJECTION, SOLUTION INTRAMUSCULAR; INTRAVENOUS at 09:24

## 2018-05-03 RX ADMIN — LIDOCAINE HYDROCHLORIDE: 20 JELLY TOPICAL at 09:19

## 2018-05-03 RX ADMIN — MIDAZOLAM HYDROCHLORIDE 1 MG: 1 INJECTION, SOLUTION INTRAMUSCULAR; INTRAVENOUS at 09:27

## 2018-05-03 RX ADMIN — SODIUM CHLORIDE 1000 ML: 900 INJECTION, SOLUTION INTRAVENOUS at 09:19

## 2018-05-03 NOTE — INTERVAL H&P NOTE
H&P Update:  Dionisio Bullock. was seen and examined. History and physical has been reviewed. The patient has been examined.  There have been no significant clinical changes since the completion of the originally dated History and Physical.    Signed By: Danica Rodriguez MD     May 3, 2018 9:17 AM

## 2018-05-03 NOTE — H&P (VIEW-ONLY)
[facility]: Patient Office Visit Note  [address] [place]. [address] [facility], [address] [facility]  [phone]    Patient Name:  Anat Choudhury. YOB: 1945            Date of Service:  4/9/2018    Chief Complaint   Patient presents with    Breathing Problem    Lung Cancer       History of Present Illness: This is a 76-year-old white male with a history of prostate cancer, COPD  whowe are seeing in consultation for Dr. Rosana Valero regarding an enlarging right lower lobe nodule. He had been hospitalized in February for plan TURP and underwent the procedure on February 14, 2017. Patient had hypoxemia postop and a chest CT demonstrated right upper lobe nodule at that time was 1.9 x 1.2 cm and was suspicious for malignancy. Patient also was noted to have prostate cancer from the TURP. The patient then had  navigation bronchoscopy and ebus by Dr. Shalini Lind in April 2017. This was to biopsy a right upper lobe nodule and this was determined to be benign. Subsequently repeat CT scans have demonstrated a decrease in size of this nodule. The patient has been followed by Dr. Rosana Valero and recent CT scan was done on April 2 and on that study right lower lobe nodule was noted to be increased to 1.3 cm from 9 mm on previous study. A PET scan is pending  Patient does have Alzheimer's and history is obtained from his wife. She does state that he gets short of breath with activities and is only able to walk up one flight of stairs before having to stop and rest.  There is an occasional cough. He does not complain of chest pain and there is been no wheezing.   He is a former smoker quitting in 2014    Past Medical History:   Diagnosis Date    Adverse effect of anesthesia     confusion-x 3 days after surgery-2015    Cancer [facility])     prostate    Chronic obstructive pulmonary disease (HCC)     Hypotension     Insomnia     Metabolic encephalopathy     Pneumonia     Psychiatric disorder     [facility] syndrome[facility]     Stroke ([facility]     tia-2016- no residual    Syncope 2015    has- implanted cardiac monitor       Problem List  Date Reviewed: 4/9/2018          Codes Class Noted    COPD (chronic obstructive pulmonary disease) (HCC) (Chronic) ICD-10-CM: J44.9  ICD-9-CM: 496  Unknown        Dementia (Chronic) ICD-10-CM: F03.90  ICD-9-CM: 294.20  Unknown        BPH (benign prostatic hyperplasia) ICD-10-CM: N40.0  ICD-9-CM: 600.00  Unknown        Depression (Chronic) ICD-10-CM: F32.9  ICD-9-CM: 311  Unknown        Hypoxia ICD-10-CM: R09.02  ICD-9-CM: 799.02  2/17/2017        Pulmonary nodule ICD-10-CM: R91.1  ICD-9-CM: 793.11  2/17/2017        History of tobacco use (Chronic) ICD-10-CM: H32.975  ICD-9-CM: V15.82  2/17/2017        Obstructive uropathy ICD-10-CM: N13.9  ICD-9-CM: 599.60  2/16/2017        Prostate enlargement ICD-10-CM: N40.0  ICD-9-CM: 600.00  2/16/2017        Hematuria ICD-10-CM: R31.9  ICD-9-CM: 599.70  2/16/2017        S/P TURP ICD-10-CM: Z90.79  ICD-9-CM: V45.89  2/16/2017              Past Surgical History:   Procedure Laterality Date    CARDIAC SURG PROCEDURE UNLIST      implanted heart monitor    HX CHOLECYSTECTOMY      HX COLECTOMY      colostomy with reversal    HX HERNIA REPAIR Left     HX ORTHOPAEDIC      knee surgery    HX UROLOGICAL      prostate       Social History     Social History    Marital status:      Spouse name: N/A    Number of children: N/A    Years of education: N/A     Occupational History    retired       Social History Main Topics    Smoking status: Former Smoker     Packs/day: 1.00     Years: 50.00     Quit date: 2/9/2014    Smokeless tobacco: Never Used      Comment: quit around 2014    Alcohol use Yes      Comment: occasional    Drug use: Not on file    Sexual activity: Not on file     Other Topics Concern    Not on file     Social History Narrative    .  Lives with wife Family History   Problem Relation Age of Onset    Heart Disease Mother     Cancer Father      lung    Stroke Sister        Allergies   Allergen Reactions    Pcn [Penicillins] Other (comments)     Unknown as infant       Outpatient Prescriptions Marked as Taking for the 4/9/18 encounter (Office Visit) with Elizabeth Canseco MD   Medication Sig Dispense Refill    divalproex DR (DEPAKOTE) 250 mg tablet Take 500 mg by mouth two (2) times a day.  memantine (NAMENDA) 10 mg tablet Take 10 mg by mouth two (2) times a day.  sertraline (ZOLOFT) 25 mg tablet Take 25 mg by mouth daily.  traZODone (DESYREL) 50 mg tablet Take  by mouth nightly.          Review of System     General:   Negative for unexplained weight loss / Weight loss / Weight gain / Fever / Chills / Fatigue / Night sweats    Eyes:  Negative for Vision loss / Blurred vision / Double vision / Pain / Redness / Dryness      Ears:  Negative for Hearing loss / [place] / Pain    Nose:  Negative for Postnasal drainage / Bleeding / Nasal congestion    Mouth/Throat:  Negative for Sore throat / Uclers / Bleeding gums / Hoarseness / Snoring    Neck:  Negative for Neck stiffness / Pain / Goiter / Mass     Respiratory:  Negative for Shortness of breath / Cough / Wheezing / Coughing up blood / Sleep apnea    Cardiovascular:  Negative for Chest pain / Palpitations / Sleep on multiple pillows / Sudden shortness of breath during sleep / History of heart murmur / Passing out / Swelling in legs or feet    Gastrointestinal:  Negative for Indigestion / Reflux / Nausea / Vomiting / Diarrhea / Constipation / Difficulty swallowing / Choking with eating or drining / Blood in stools / Loss of appetite / Abdominal pain    Genitourinary:  Negative for Pain with urination / Frequent urination / Blood in urine / Frequent urination at night    Musculoskeletal:  Negative for Pain in joints / Pain in muscles / Weakness in arms or legs / Back pain    Neurological:  Negative for Seizures / Severe headaches / Dizziness / Numbness / Tremors / Memory loss / Morning headaches    Psychiatric:  Negative for Anxiety / Depression / Suicidal thoughts or plans / Hallucinations    Allergies:  Negative for Seasonal allergies / Watery itchy eyes / Nasal congestion or drainage    Skin:  Negative for Rash / Lesions / Itching / Hair loss / Dry skin    Hematology/ Lymph:  Negative for Bruise easily / Anemia / Swollen or tender glands / Blood clots       OBJECTIVE:  Physical Exam:  Visit Vitals    /63    Pulse 69    Temp 98 °F (36.7 °C) (Oral)    Resp 20    Ht 5' 6\" (1.676 m)    Wt 160 lb (72.6 kg)    SpO2 94%    BMI 25.82 kg/m2        GENERAL APPEARANCE:   The patient is normal weight and in no respiratory distress. HEENT:   PERRL. Conjunctivae unremarkable. Nasal mucosa is without epistaxis, exudate, or polyps. Gums and dentition are unremarkable. There is no oropharyngeal narrowing. TMs are clear. NECK/LYMPHATIC:   Symmetrical with no elevation of jugular venous pulsation. Trachea midline. No thyroid enlargement. No cervical adenopathy. LUNGS:   Normal respiratory effort with symmetrical lung expansion. Breath sounds clear to auscultation but decreased. HEART:   There is a regular rate and rhythm. No murmur, rub, or gallop. There is no edema in the lower extremities. ABDOMEN:   Soft and non-tender. No hepatosplenomegaly. Bowel sounds are normal.       SKIN:   There are no rashes, cyanosis, jaundice, or ecchymosis present. EXTREMITIES:   The extremities are unremarkable without clubbing, cyanosis, joint inflammation, degenerative, or ischemic change. MUSCULOSKELETAL:   There is no abnormal tone, muscle atrophy, or abnormal movement present. NEURO:   The patient is alert and oriented to person, place, and time. Memory appears intact and mood is normal.  No gross sensorimotor deficits are present.       DIAGNOSTIC TESTS:   CT of chest: Enlarging 1.3 cm nodule right lower lobe      Spirometry:  04/09/2018 moderately severe obstruction     Exercise oximetry:     PCXR:   Results for orders placed during the hospital encounter of 02/14/17   XR CHEST PORT    Narrative HISTORY: Shortness of breath. EXAM: AP chest radiograph    PRIOR STUDY: 02/09/2017    FINDINGS: The cardiomediastinal silhouette is normal. Pulmonary vasculature is  within normal limits. There is no focal consolidation, pleural effusion, or  pneumothorax. Cardiac monitoring device overlies the left hemithorax. Impression IMPRESSION: No acute cardiopulmonary process. CXR PA and lateral:    Results for orders placed during the hospital encounter of 02/09/17   XR CHEST PA LAT    Narrative Two view chest:  02/09/2017    History: Preoperative evaluation for prostate surgery. Comparison: None    Findings: A small electronic device overlies the anterior left hemithorax. The  heart and mediastinal silhouette are normal in size and configuration. Right  apical reticulonodular opacities are present. There are no pleural effusions. The pulmonary vascularity is within normal limits. The visualized osseous  structures are unremarkable. Impression Impression:   Mild right apical reticulonodular opacities. These findings may  represent underlying chronic change. An infectious/inflammatory etiology could  also have this appearance. Comparison to any prior remote studies would be  helpful. If and when these are made available an addendum will be made. PET/CT: No results found for this or any previous visit. CT of chest w contrast:    Results for orders placed during the hospital encounter of 11/28/17   CT CHEST W CONT    Narrative History: Follow-up pulmonary lesions. History of lung cancer.     EXAM: CT chest with IV contrast    TECHNIQUE: Thin section axial CT images are obtained from the thoracic inlet  through the upper abdomen after the uneventful administration of 80 cc  Isovue-370. Radiation dose reduction techniques were used for this study. Our  CT scanners use one or all of the following: Automated exposure control,  adjustment of the mA and/or kV according to patient size, use of iterative  reconstruction. COMPARISON: 4/12/2017    FINDINGS: There is extensive centrilobular emphysematous change present. There  is an ill-defined area of abnormal opacity in the right upper lobe, but this has  decreased in prominence when compared with the prior exam (image 13). There is  scarring in the lingula. Within the right lower lobe, there is a multilobular  pulmonary nodule. This has enlarged when compared with the prior exam, now  measuring 9 mm (previously measuring 6 mm). This is seen on image 42. Evaluation of the upper abdomen demonstrates clips from prior cholecystectomy. Bone window evaluation demonstrates no aggressive osseous lesions. Impression IMPRESSION:  1. Lobular pulmonary nodule seen in the right lower lobe, larger than seen  previously, now measuring 9 mm (previously measuring 6 mm). Short interval  follow-up recommended for further evaluation. 2. Ill-defined area of abnormal opacity in the right upper lobe, but this area  has decreased in prominence when compared with the prior exam.  3. Centrilobular emphysematous change. Screening chest CT: No results found for this or any previous visit. CT of chest w/out contrast:     Results for orders placed during the hospital encounter of 04/02/18   CT CHEST WO CONT    Narrative CT Chest without contrast    Clinical Indication:  Follow-up of lung nodules with history including stroke,  COPD, prostate cancer, right upper lobe bronchogenic lung carcinoma, former  smoker with 50-60 pack year history    Comparison:  11/28/2017    Technique: Automated exposure Control was used.  Contiguous axial images were  obtained from the neck base through the upper abdomen without contrast. Low-dose  nodule follow-up protocol. Findings:  Lung windows again demonstrate upper lobe predominant emphysema. There are mild small peripheral groundglass and linear opacities in the right  upper lobe at the site of a previously measured malignant nodule, which is no  longer discretely measurable. The spiculated right lower lobe nodule measures up  to 1.3 cm (previously 0.9 cm at this level). Tiny benign right middle lobe  calcified granuloma again noted. No evidence of a left lung mass. No evidence of  pneumothorax, honeycombing, bronchiectasis or consolidation. There is no evidence of developing thoracic lymphadenopathy, pleural or  pericardial effusion. Multiple small calcified mediastinal and hilar lymph nodes  are again seen. There are scattered atherosclerotic calcifications of the aorta,  its branches, and the coronary arteries. Normal caliber aorta and esophagus. Metallic device in the left chest wall may represent a cardiac pacemaker. Limited upper abdominal views demonstrate no adrenal mass or acute abnormality,  with cholecystectomy clips again noted. Bone windows demonstrate no acute or  aggressive osseous lesions. Impression Impression:   1. Enlarging spiculated 1.3 cm right lower lobe nodule suspicious for  malignancy. 2. Stable right upper lobe opacities. [address] [place] [address] significant findings in this report have been referred to the Imaging  Navigator in order to communicate to the referring provider or his/her designee  as outlined in Section II.C.2.a.ii-iii of the ACR Practice Guideline for  Communication of Diagnostic Imaging Findings. ASSESSMENT:    ICD-10-CM ICD-9-CM    1. Pulmonary emphysema, unspecified emphysema type (HCC) J43.9 492.8 AMB POC SPIROMETRY   2.  Hypoxia R09.02 799.02 AMB POC SPIROMETRY   3. Pulmonary nodule-enlarging and suspicious for malignancy previous similar problem was benign R91.1 793.11        PLAN:  · In 6 weeks follow-up appointment  · stiolto 2 puffs q day  · Pet scan set up for April 24  · Navigation bronch  following the pet scan if the PET scan is positive and if an airway can be identified leading to the lesion.       Orders Placed This Encounter    AMB POC SPIROMETRY       Electronically signed by  Traci Lind MD

## 2018-05-03 NOTE — IP AVS SNAPSHOT
[address] [facility], [place], operated by [facility] 
 
 
 [address] [facility] [address] [facility] 
[phone] Patient: Karie Oscar. MRN: [id number] KLS:[id number][birth year]31 About your hospitalization You were admitted on:  May 3, 2018 You last received care in the:  [facility] You were discharged on:  May 3, 2018 Why you were hospitalized Your primary diagnosis was:  Not on File Your diagnoses also included:  Pulmonary Nodule Follow-up Information None Your Scheduled Appointments Tuesday May 15, 2018  1:30 PM EDT  
[address] [facility] OFFICE VISIT with Shady Vogel MD  
[address] [facility]  [address] [address] [facility] [address] [facility] [id number] [phone] Friday May 25, 2018  2:40 PM EDT  
(Arrive by 2:20 PM) Return appointment with [facility] ([facility]) [address] [facility] [address]  
[phone] Discharge Orders None A check waldemar indicates which time of day the medication should be taken. My Medications ASK your doctor about these medications Instructions Each Dose to Equal  
 Morning Noon Evening Bedtime  
 albuterol sulfate 90 mcg/actuation Aepb Commonly known as:  Mercedes Bark Your last dose was: Your next dose is: Take 2 Puffs by inhalation every six (6) hours as needed. 2 Puff  
    
   
   
   
  
 divalproex  mg tablet Commonly known as:  DEPAKOTE Your last dose was: Your next dose is: Take 500 mg by mouth two (2) times a day. 500 mg NAMENDA 10 mg tablet Generic drug:  memantine Your last dose was: Your next dose is: Take 10 mg by mouth two (2) times a day.   
 10 mg  
    
   
   
   
  
 tiotropium-olodaterol 2.5-2.5 mcg/actuation Mist  
 Commonly known as:  Maylin Tee Your last dose was: Your next dose is: Take 2 Puffs by inhalation daily. 2 Puff  
    
   
   
   
  
 traZODone 50 mg tablet Commonly known as:  Gerardo Moreau Your last dose was: Your next dose is: Take  by mouth nightly. ZOLOFT 25 mg tablet Generic drug:  sertraline Your last dose was: Your next dose is: Take 25 mg by mouth daily. 25 mg Discharge Instructions RESPIRATORY CARE - BRONCHOSCOPY - DISCHARGE INSTRUCTIONS You received a lot of numbing medication for your throat and nose, and you also received medication to make you sleepy during your procedure. Because of this and because the bronchoscopy may have irritated your airways, we ask that you follow these directions: 1. Do not eat or drink until 11:00 a.m. After that, you may have what you please. You may want to try some liquids first, because your throat may be a little sore. 2. Medication may cause drowsiness for several hours, therefore, do not drive or operate machinery for remainder of the day. No alcohol today. 3. You may cough up more mucus than usual and you may see some blood, but this is expected and should subside by the following day. 4. If severe throat irritation, coughing, or bleeding continue, call your doctor. 5.         If you run a fever greater than 102, call [facility] at [phone]. Instructions given to Garth Medina and other family members Introducing [facility]! [employer] introduces [employer] patient portal. Now you can access parts of your medical record, email your doctor's office, and request medication refills online. 1. In your internet browser, go to [email or web address] [employer] 2. Click on the First Time User? Click Here link in the Sign In box. You will see the New Member Sign Up page. 3. Enter your [employer] Access Code exactly as it appears below. You will not need to use this code after youve completed the sign-up process. If you do not sign up before the expiration date, you must request a new code. · [employer] Access Code: [email or web address] Expires: 6/28/2018  8:14 AM 
 
4. Enter the last four digits of your Social Security Number (xxxx) and Date of Birth (mm/dd/yyyy) as indicated and click Submit. You will be taken to the next sign-up page. 5. Create a Monkeysee ID. This will be your [employer]see login ID and cannot be changed, so think of one that is secure and easy to remember. 6. Create a [employer] password. You can change your password at any time. 7. Enter your Password Reset Question and Answer. This can be used at a later time if you forget your password. 8. Enter your e-mail address. You will receive e-mail notification when new information is available in [address] 9. Click Sign Up. You can now view and download portions of your medical record. 10. Click the Download Summary menu link to download a portable copy of your medical information. If you have questions, please visit the Frequently Asked Questions section of the [employer]see website. Remember, [employer]see is NOT to be used for urgent needs. For medical emergencies, dial 911. Now available from your iPhone and Android! Introducing Juan A Ferro As a [employer] patient, I wanted to make you aware of our electronic visit tool called Juan A Ferro. [employer] 24/7 allows you to connect within minutes with a medical provider 24 hours a day, seven days a week via a mobile device or tablet or logging into a secure website from your computer. You can access Juan A Ferro from anywhere in the [place].  
 
A virtual visit might be right for you when you have a simple condition and feel like you just dont want to get out of bed, or cant get away from work for an appointment, when your regular Lakeisha Wattserin provider is not available (evenings, weekends or holidays), or when youre out of town and need minor care. Electronic visits cost only $49 and if the Lakeisha Wattserin 24/7 provider determines a prescription is needed to treat your condition, one can be electronically transmitted to a nearby pharmacy*. Please take a moment to enroll today if you have not already done so. The enrollment process is free and takes just a few minutes. To enroll, please download the [employer] 24/7 neftali to your tablet or phone, or visit [email or web address] to enroll on your computer. And, as an [address] [place] [address] patient with a [employer] account, the results of your visits will be scanned into your electronic medical record and your primary care provider will be able to view the scanned results. We urge you to continue to see your regular Lakeisha Gonzalez provider for your ongoing medical care. And while your primary care provider may not be the one available when you seek a [employer] virtual visit, the peace of mind you get from getting a real diagnosis real time can be priceless. For more information on [employer], view our Frequently Asked Questions (FAQs) at [email or web address]. Sincerely, 
 
Freda Naqvi MD 
Chief Medical Officer Irma Cade *:  certain medications cannot be prescribed via [employer] Unresulted Labs-Please follow up with your PCP about these lab tests Order Current Status NC XR TECHNOLOGIST SERVICE In process Providers Seen During Your Hospitalization Provider Specialty Primary office phone Girma Sparks MD Pulmonary Disease [phone] Your Primary Care Physician (PCP) Primary Care Physician Office Phone Office Fax Burt Florian [phone] You are allergic to the following Allergen Reactions Pcn (Penicillins) Other (comments) Unknown as infant Recent Documentation Height Weight BMI Smoking Status 1.676 m 71.7 kg 25.5 kg/m2 Former Smoker Emergency Contacts Name Discharge Info Relation Home Work Mobile [address] [facility] CAREGIVER [3] Spouse [3] [phone] Patient Belongings The following personal items are in your possession at time of discharge: 
  Dental Appliances: Uppers  Visual Aid: Glasses   Hearing Aids/Status: At home Please provide this summary of care documentation to your next provider. Signatures-by signing, you are acknowledging that this After Visit Summary has been reviewed with you and you have received a copy. Patient Signature:  ____________________________________________________________ Date:  ____________________________________________________________  
  
[facility] Provider Signature:  ____________________________________________________________ Date:  ____________________________________________________________

## 2018-05-03 NOTE — PROCEDURES
PROCEDURE  Bronchoscopy with airway inspection and EMN aided transbronchial lung biopsy of peripheral lung nodule with ENB and endobronchial ultrasound guided placement of fiducial markers for stereotactic radiation/cyber knife therapy. EQUIPEMENT  Olympus T180 bronchoscope  Super Dimension EMN system  90 deg stearable sheath  Olympus [id number] EBUS scope  UM 17/20 Radial probe  Super D forceps, triple needle brush   SuperLock Cobra fiducial marker x 3    INDICATION   Peripheral nodule suspicious for malignancy    IMAGING  CT 5/3/18        POST OP DIAGNOSIS:  Super Dimension EMN system was employed to identify and biopsy the RLL nodule seen on CT and PET with subsequent placement of fiducial markers. .  6 transbronchial lung biopsies, 1 brushings, 1 triple needle biopsies, and 1 BAL were performed with touch preparations revealing atypical cells on KAREN. Histology from obtained biopsies is pending. There were no mediastinal targets for EBUS. Following biopsy, fiducial marker was placed alongside the target lesion and 2 addition fiducials placed in locations between 2-5cm away. Based on imaging and biopsies, pt is a presumed STAGE [id number] (IA2) non-small cell lung cancer. ANESTHESIA  Concious sedation with: Fentanyl 150 mcg IV; Versed 3 mg IV; Lidocaine 200 mg to tracheo-bronchial tree and vocal cords. AIRWAY INSPECTION  After obtaining informed consent, using a bite block, an Olympus Q 180 viedeo bronchoscope was  introduced into the trachea through the vocal cords without complication.     RIGHT  LOCATION NORM/ABNORMAL DESCRIPTION   VOCAL CORDS NL    TRACHEA NL    MAYLIN NL    RMSB NL    RUL NL    BI NL    RML NL    RLL NL    SUP SEGM RLL NL    MED BASAL NL    ANTERIOR BASAL NL    LATERAL BASAL NL    POSTERIOR BASAL NL            LEFT  LOCATION NORMAL/ABNORMAL TYPE   LMSB NL    CANDELARIO NL    LINGULA NL    LLL NL    SUPERIOR SEG LLL NL    HUGH-MEDIAL LLL NL    LATERAL LLL NL POSTERIOR LLL NL             Navigation    Olympus T 180 bronchoscope was introduced into the airways to identify and biopsy the RLL nodule with the aid of Super D EMN system and radial probe US. CT images acquired with Super D protocol were used to plan the pathway to target lesion planned using Super D software. Planning data was then used to navigate to the nodule, with verification of location using radial US. Visibility with radial US was: fair (lesion was adjacent to the available airway, did not dead end into the lesion). 6 transbronchial lung biopsies were obtained and evaluated via touch prep and KAREN. Touch preps revealed atypical cells. Histology from obtained tissue is currently pending. TOUCH PREP BIOPSY# MALIGNANT ATYPIA/SUSPICIOUS DIAGNOSIS   RLL 1 - + -    2 - + -    3 - blood -    4 - blood -    5 - blood -    6 - blood -         Fiducial Marker Placement  After completing the airway inspection and biopsy with the aid of Super D EMN system and radial probe US planning data was then used to navigate to the RLL target, with verification of proximity using the radial US. Fiducial markers were then inserted in the usual fashion using deployment sheath under fluoroscopic guidance, in 3 separate locations with one marker placed within or right next to the lesion. After excluding PTX with the aid of fluoroscopy and good location of the markers, the procedure was completed. EBL: <15FV    Complications: None    Diagnosis: Suspected NSCLC    Plan:  Patient known to Trish Jack and Gifty Lindquist. Will follow up final path results and have them follow up regarding necessary treatment.        Adriano Chappell MD

## 2018-05-03 NOTE — DISCHARGE INSTRUCTIONS
RESPIRATORY CARE - BRONCHOSCOPY - DISCHARGE INSTRUCTIONS      You received a lot of numbing medication for your throat and nose, and you also received medication to make you sleepy during your procedure. Because of this and because the bronchoscopy may have irritated your airways, we ask that you follow these directions:    1. Do not eat or drink until 11:00 a.m. After that, you may have what you please. You may want to try some liquids first, because your throat may be a little sore. 2. Medication may cause drowsiness for several hours, therefore, do not drive or operate machinery for remainder of the day. No alcohol today. 3. You may cough up more mucus than usual and you may see some blood, but this is expected and should subside by the following day. 4. If severe throat irritation, coughing, or bleeding continue, call your doctor. 5.         If you run a fever greater than 102, call [facility] at [phone].           Instructions given to Sivan Berman and other family members

## 2018-05-14 NOTE — PROGRESS NOTES
Spoke with the patient's wife Sp Mojica in regards to the patient's lung biopsy results. Explained to Sp Mojica that per Dr. Naga Clark the lung biopsy did not show cancerous cells but the nodule could still be cancerous and we feel that radiation therapy is reasonable. I also explained to her that we will be on standby unless Dr. Merary Harrison needs us to see him again. Sp Mojica understood and will notify the patient of their results. No other questions or concerns were asked at this time. // Meenu BURNETT

## 2018-05-15 ENCOUNTER — HOSPITAL ENCOUNTER (OUTPATIENT)
Dept: RADIATION ONCOLOGY | Age: 73
Discharge: HOME | End: 2018-05-15
Payer: MEDICARE

## 2018-05-15 VITALS
RESPIRATION RATE: 18 BRPM | SYSTOLIC BLOOD PRESSURE: 151 MMHG | BODY MASS INDEX: 27.21 KG/M2 | WEIGHT: 163.3 LBS | HEIGHT: 65 IN | TEMPERATURE: 98.9 F | OXYGEN SATURATION: 93 % | HEART RATE: 72 BPM | DIASTOLIC BLOOD PRESSURE: 80 MMHG

## 2018-05-15 PROCEDURE — 99211 OFF/OP EST MAY X REQ PHY/QHP: CPT

## 2018-05-15 NOTE — PROGRESS NOTES
Patient: Hayden Morales. MRN: [id number]  SSN: [id number]    YOB: 1945  Age: 75 y.o. Sex: male      Other Providers:   MD Zenia Proctor MD    CHIEF COMPLAINT: Lung cancer    DIAGNOSIS: Probable T1 N0 right lower lobe lung cancer      HISTORY OF PRESENT ILLNESS:  Hayden Morales. is a 75 y.o. male who I am seeing at the request of Dr. Kristen Le for lung cancer. tthe patient has medical history significant for Alzheimer's/dementia, TIA in 2016, syncopal episodes, colectomy after bowel rupture in 2008,  former smoker of >50 pack years. However, he has overall good performance status. He was admitted from 02/14/17 through 02/18/17 for a planned TURP secondary to BPH. He underwent this procedure on 02/14/17. Following procedure he had episodes of confusion. He was found to be hypoxic postoperatively. This led to a chest CT on 02/16/17 that showed a right upper lobe spiculated nodule measuring 1.9 x 1.2 cm that was suspicious for malignancy. In addition, a right lower lobe 5 mm indeterminate nodule was seen. Pathology from the TURP revealed Vanderbilt 3+3 = 6 adenocarcinoma and 13/173 prostate chips. PET/CT at an outside facility on 02/26/17 showed FDG activity (max SUV 2.5) in the right upper lobe lesion, described as a lobular solid mass versus 3 adjacent solid nodules. SUV max of the left upper lobe 15 mm nodule was 2.7. The well-defined 5 mm solid nodule in the right lower lobe has a maximum SUV of 0.62. There was also a fairly prominent area of uptake in the left base of the nose, as well as possible submandibular node. Navigation bronchoscopy in 04/17 by Dr. Jean Marie Brooks  yielded benign pathology from the right upper lobe nodule. Repeat CT scans showed decreased size of this nodule. CT scan of the chest on 04/02/18 identified an enlarging spiculated 1.3 cm right lower lobe nodule (previously 0.9 cm) suspicious for malignancy.    Stable right upper lobe opacities were noted. PET/CT scan on 04/24/18 showed an enlarging right lower lobe nodule clearly FDG avid with an SUV max of 2.7 felt to be suspicious for urinary new primary lung cancer or metastasis. He has been sent for navigation bronchoscopy. INTERVAL HISTORY: Mr. Luis Atkins returns for further discussion of his presumed T1 RLL lung cancer. On 05/03/18 he underwent bronchoscopy with airway inspection and EMN aided transbronchial lung biopsy of peripheral lung nodule with ENB and endobronchial ultrasound guided placement of fiducial markers for stereotactic radiation with CyberKnife. Pathology revealed BENIGN ALVEOLAR AND BRONCHIAL LUNG TISSUE WITH SLIGHT CHRONIC INFLAMMATION AND FOCAL MINIMAL EPITHELIAL ATYPIA. Dr. Alan Boggs spoke with the patient's wife to inform her of the pathology, but also to stress that although the biopsy did not show cancer, the nodule could still be cancerous and that the recommendation for SBRT did not change. At this time, Mr. Luis Atkins continues to have significant debility from Alzheimer's. His wife answers most questions for him. She notes that he is out of breath easily with exertion. Otherwise she is doing well. He has occasional cough is productive of clear/white sputum. His appetite is moderate. His weight is stable. PAST MEDICAL HISTORY:    Past Medical History:   Diagnosis Date    Adverse effect of anesthesia     confusion-x 3 days after surgery-2015    Cancer [facility])     prostate    [facility] obstructive pulmonary disease [facility] [address]     Hypotension     Insomnia     Metabolic encephalopathy     Pneumonia     Psychiatric disorder     SIRS (systemic inflammatory response syndrome) ([facility] [address] [facility]     Stroke ([facility] [address]     tia-2016- no residual    Syncope 2015    has- implanted cardiac monitor       The patient denies history of collagen vascular diseases, pacemaker insertion, prior radiation or prior chemotherapy.      PAST SURGICAL HISTORY:   Past Surgical History:   Procedure Laterality Date  CARDIAC SURG PROCEDURE UNLIST      implanted heart monitor    HX CHOLECYSTECTOMY      HX COLECTOMY      colostomy with reversal    HX HERNIA REPAIR Left     HX ORTHOPAEDIC      knee surgery    HX UROLOGICAL      prostate       MEDICATIONS:     Current Outpatient Prescriptions:     divalproex DR (DEPAKOTE) 250 mg tablet, Take 500 mg by mouth two (2) times a day., Disp: , Rfl:     memantine (NAMENDA) 10 mg tablet, Take 10 mg by mouth two (2) times a day., Disp: , Rfl:     sertraline (ZOLOFT) 25 mg tablet, Take 25 mg by mouth daily. , Disp: , Rfl:     traZODone (DESYREL) 50 mg tablet, Take  by mouth nightly., Disp: , Rfl:     tiotropium-olodaterol (STIOLTO RESPIMAT) 2.5-2.5 mcg/actuation mist, Take 2 Puffs by inhalation daily. , Disp: 1 Inhaler, Rfl: 11    albuterol sulfate (PROAIR RESPICLICK) 90 mcg/actuation aepb, Take 2 Puffs by inhalation every six (6) hours as needed. , Disp: 1 Inhaler, Rfl: 1    ALLERGIES:   Allergies   Allergen Reactions    Pcn [Penicillins] Other (comments)     Unknown as infant       SOCIAL HISTORY:   Social History     Social History    Marital status:      Spouse name: N/A    Number of children: N/A    Years of education: N/A     Occupational History    retired       Social History Main Topics    Smoking status: Former Smoker     Packs/day: 1.00     Years: 50.00     Quit date: 2/9/2014    Smokeless tobacco: Never Used      Comment: quit around 2014    Alcohol use Yes      Comment: occasional    Drug use: No    Sexual activity: Not on file     Other Topics Concern    Not on file     Social History Narrative    . Lives with wife       FAMILY HISTORY:   Family History   Problem Relation Age of Onset    Heart Disease Mother     Cancer Father      lung    Stroke Sister        REVIEW OF SYSTEMS: Please see the completed review of systems sheet in the chart that I have reviewed today.       PHYSICAL EXAMINATION:   ECOG Performance status 1  VITAL SIGNS:   Visit Vitals    /80 (BP 1 Location: Left arm, BP Patient Position: Sitting)    Pulse 72    Temp 98.9 °F (37.2 °C)    Resp 18    Ht 5' 5\" (1.651 m)    Wt 74.1 kg (163 lb 4.8 oz)    SpO2 93%    BMI 27.17 kg/m2        GENERAL: The patient is well-developed, ambulatory, alert and in no acute distress. HEENT: Head is normocephalic, atraumatic. Pupils are equal, round and reactive to light and accommodation. Extraocular movement intact. NECK: Neck is supple with no masses. CARDIOVASCULAR: Heart has regular rate and rhythm. There are no murmurs, rubs or gallops. Radial pulses are 2+ RESPIRATORY: Lungs are clear to auscultation and percussion. There is normal respiratory effort. LYMPHATIC: There is no cervical, supraclavicular or axillary lymphadenopathy bilaterally. MUSCULOSKELETAL: Extremities reveal no cyanosis, clubbing or edema.  is 5+/5. NEURO:  Cranial nerves II-XII grossly intact. Muscular strength and sensation are intact throughout all four extremities. PATHOLOGY:      05/03/18:    DIAGNOSIS   RIGHT LOWER LOBE LUNG NODULE BIOPSY: BENIGN ALVEOLAR AND BRONCHIAL LUNG TISSUE WITH SLIGHT CHRONIC INFLAMMATION AND FOCAL MINIMAL EPITHELIAL ATYPIA. DIAGNOSIS   RLL Triple Needle Bronchial Brushing:   NO MALIGNANT CELLS IDENTIFIED. DIAGNOSIS   RLL Mini Bronchial Lavage:   NO MALIGNANT CELLS IDENTIFIED.     04/13/17:    DIAGNOSIS   RIGHT UPPER LOBE NODULE BIOPSY:   BENIGN LUNG PARENCHYMA WITH ATELECTASIS, MILD CHRONIC INFLAMMATION AND   REACTIVE PNEUMOCYTE ATYPIA. NEGATIVE FOR MALIGNANCY.     02/14/17:    DIAGNOSIS   PROSTATE, TRANSURETHRAL RESECTION OF PROSTATE TISSUE: PROSTATIC ADENOCARCINOMA, JACQUELINE SCORE 3 + 3 = 6 INVOLVING 13  PROSTATE CHIPS. CYSTITIS CYSTICA. SEE COMMENT.      LABORATORY:   Lab Results   Component Value Date/Time    Sodium 141 04/02/2018 02:04 PM    Potassium 3.7 04/02/2018 02:04 PM    Chloride 104 04/02/2018 02:04 PM    CO2 32 04/02/2018 02:04 PM    Anion gap 5 (L) 04/02/2018 02:04 PM    Glucose 81 04/02/2018 02:04 PM    BUN 10 04/02/2018 02:04 PM    Creatinine 1.14 04/02/2018 02:04 PM    GFR est AA >60 04/02/2018 02:04 PM    GFR est non-AA >60 04/02/2018 02:04 PM    Calcium 8.5 04/02/2018 02:04 PM    Albumin 3.3 04/02/2018 02:04 PM    Protein, total 7.1 04/02/2018 02:04 PM    Globulin 3.8 (H) 04/02/2018 02:04 PM    A-G Ratio 0.9 (L) 04/02/2018 02:04 PM    AST (SGOT) 18 04/02/2018 02:04 PM    ALT (SGPT) 16 04/02/2018 02:04 PM     Lab Results   Component Value Date/Time    WBC 7.5 04/02/2018 02:04 PM    HGB 13.2 (L) 04/02/2018 02:04 PM    HCT 39.8 (L) 04/02/2018 02:04 PM    PLATELET 610 00/45/0999 02:04 PM       RADIOLOGY:    Ct Chest Wo Cont    Result Date: 4/2/2018  CT Chest without contrast Clinical Indication:  Follow-up of lung nodules with history including stroke, COPD, prostate cancer, right upper lobe bronchogenic lung carcinoma, former smoker with 50-60 pack year history Comparison:  11/28/2017 Technique: Automated exposure Control was used. Contiguous axial images were obtained from the neck base through the upper abdomen without contrast. Low-dose nodule follow-up protocol. Findings:  Lung windows again demonstrate upper lobe predominant emphysema. There are mild small peripheral groundglass and linear opacities in the right upper lobe at the site of a previously measured malignant nodule, which is no longer discretely measurable. The spiculated right lower lobe nodule measures up to 1.3 cm (previously 0.9 cm at this level). Tiny benign right middle lobe calcified granuloma again noted. No evidence of a left lung mass. No evidence of pneumothorax, honeycombing, bronchiectasis or consolidation. There is no evidence of developing thoracic lymphadenopathy, pleural or pericardial effusion. Multiple small calcified mediastinal and hilar lymph nodes are again seen.  There are scattered atherosclerotic calcifications of the aorta, its branches, and the coronary arteries. Normal caliber aorta and esophagus. Metallic device in the left chest wall may represent a cardiac pacemaker. Limited upper abdominal views demonstrate no adrenal mass or acute abnormality, with cholecystectomy clips again noted. Bone windows demonstrate no acute or aggressive osseous lesions. Impression: 1. Enlarging spiculated 1.3 cm right lower lobe nodule suspicious for malignancy. 2. Stable right upper lobe opacities. Sidumula 60 significant findings in this report have been referred to the Imaging Navigator in order to communicate to the referring provider or his/her designee as outlined in Section II.C.2.a.ii-iii of the ACR Practice Guideline for Communication of Diagnostic Imaging Findings. Pet/ct Tumor Image Skull Thigh (sub)    Result Date: 4/25/2018  Nuclear Medicine Whole Body CT / PET- FDG Study 4/24/2018 3:50 PM INDICATION: Non-small cell lung cancer restaging COMPARISON: Prior studies-most recent: CT chest 4/2/2018. PET/CT 7/11/2017. TECHNIQUE:   Radiopharmaceutical: 14.29 mCi F18-FDG IV. This is a whole-body PET- FDG study performed on a dedicated full- ring scanner. Low dose, nondiagnostic CT axial source images are also acquired for PET attenuation correction and are utilized for PET-CT fusion with the emission images. The patient is not a diabetic and underwent adequate fasting of at least 4 hours. Blood glucose at the time of tracer administration is 82 mg/dl, which is adequate for imaging. Approximately 60 minutes after administration of the radiopharmaceutical imaging was initiated covering the skull to the thighs. SUV max. Calculated from patient body wt. Noncaloric MDGASTROVIEW dilute oral contrast is given. FINDINGS:  Examination of the 3D tomographic PET images demonstrates no new suspicious hypermetabolic finding intracranially or in the head and neck.  The enlarging noncalcified nodule in the right lower lobe-shows increased hypermetabolic activity today. SUV Max for this is 2.7. Posttreatment scarring and emphysema changes in the lung apices are stable and on CT and only shows minimal there is no suspicious hypermetabolic activity identified below the diaphragm in the abdomen and pelvis today. FDG activity-less evident than before. There is no suspicious hypermetabolic hilar or mediastinal lymph node. Below the diaphragm in the abdomen and pelvis in the left lobe of the liver there is a faintly prominent small metabolic focus seen on image 166. This is too small to further characterize on PET. Elsewhere there is normal physiologic FDG activity only. No suspicious skeletal lesion. IMPRESSION: 1. Enlarging right lower lobe nodule now clearly FDG avid-suspicious for either a new primary lung cancer or metastasis. Treated lesion seen previously in the right upper lobe is stable on CT and less FDG avid compatible with resolving post inflammatory change only. 2. There is an indeterminant small metabolic focus in the left lobe of the liver today-too small to characterize well on PET/CT. With the patient's history-recommend either hepatic protocol contrast-enhanced liver MRI or CT for further characterization of this. IMPRESSION:  Sulma Marrufo is a 75 y.o. male with presumed T1 RLL lung cancer. COUNSELING AND COORDINATION OF CARE: I have had a 45 minute follow-up with Mr. Bernie Mistry and his wife of which greater than half has been spent counseling them about management options for his presumed T1 lung cancer. We had a long discussion regarding the natural history and implications of the diagnosis of lung cancer. Prognostic factors including stage, underlying lung function, age and performance status were discussed. Various treatment options including surgery, and definitive radiation therapy were compared and contrasted with regard to outcome and quality of life.  We discussed the recent advances in technology, mainly related to stereotactic body radiotherapy, and its ability to deliver higher doses of radiation more focally with higher precision so as to avoid high-grade toxicity. I reviewed some of the recent and pertinent literature in support of SBRT where control rates have been documented in excess of 90%, a substantial improvement over previous reports with conventional fractionation with control rates on the order of 60-70%. I outlined the potential toxicities which are related to the location with central bronchial injury for tumors located in close proximity to the bronchial tree, versus a higher risk of chest wall pain and rib fracture with lesions adjacent to the chest wall. In either situation, the chance of high-grade toxicity is exceedingly low. Fatigue, dysphasia, skin irritation, and low blood counts were among other acute complications discussed. Damage to normal lung, heart, vasculature and esophagus were among the late complications highlighted. Despite a second biopsy, he still does not have pathological confirmation of lung cancer. He has undergone navigation bronchoscopy with biopsy and placement of fiducial markers for potential CyberKnife use in the future. Despite the lack of pathology, the nodule has been growing, is PET avid and looks like a primary lung cancer. I have told the patient and his wife that would could observe for another 3 months and then decide about treatment based on growth characteristics. However, they would like to move forward with treatment. I have recommended treatment to a dose of 54 Gy in 3 fractions using CyberKnife SBRT. We have had a detailed discussion of potential side effects of treatment. The patient and his wife have asked numerous questions which were answered to the best of my ability and he has signed written informed consent for treatment. He will undergo [facility] simulation at [facility] on Friday with the intent of beginning treatment 1-2 weeks after.        I appreciate the opportunity to participate in Mr. Plummer's care. Yuliet Shukla MD   May 15, 2018      Portions of this note were copied from prior encounters and reviewed for accuracy, currency, and represent documentation and tasks completed during this encounter. I verify and attest these portions to be unchanged from prior visits.     Yuliet Shukla MD  05/15/18

## 2018-05-15 NOTE — PROGRESS NOTES
Pt has a history of prostate cancer, but is here today to discuss RT to this lung nodule. Pt will have his CT/Sim for Cyberknife to the lung on 5/18/18 at 1 pm at [facility]. RT consents signed today. Records scan/email to [facility].

## 2019-01-03 ENCOUNTER — HOSPITAL ENCOUNTER (OUTPATIENT)
Dept: PET IMAGING | Age: 74
Discharge: HOME | End: 2019-01-03
Payer: MEDICARE

## 2019-01-03 DIAGNOSIS — C34.31 PRIMARY MALIGNANT NEOPLASM OF BRONCHUS OF RIGHT LOWER LOBE (HCC): ICD-10-CM

## 2019-01-03 PROCEDURE — A9552 F18 FDG: HCPCS

## 2019-01-03 PROCEDURE — 74011636320 HC RX REV CODE- 636/320: Performed by: RADIOLOGY

## 2019-01-03 RX ORDER — SODIUM CHLORIDE 0.9 % (FLUSH) 0.9 %
5-10 SYRINGE (ML) INJECTION
Status: COMPLETED | OUTPATIENT
Start: 2019-01-03 | End: 2019-01-03

## 2019-01-03 RX ADMIN — DIATRIZOATE MEGLUMINE AND DIATRIZOATE SODIUM 10 ML: 660; 100 LIQUID ORAL; RECTAL at 16:06

## 2019-01-03 RX ADMIN — Medication 10 ML: at 16:06

## (undated) DEVICE — FORCEPS BX WRK L110MM CHN L2MM DIA1.7MM SUPERDIMENSION

## (undated) DEVICE — CATHETER URETH 24FR BLLN 30CC STD LTX 3 W TWO OPP DRNGE EYE

## (undated) DEVICE — PATCH SENS PT FOR ELECTROMAGNETIC NAVIGATION BRONCHSCP SYS

## (undated) DEVICE — Z DUP USE 2381765 CATHETER DIAG 180DEG FIRM TIP EWC EDGE 180 [SDK3000OLYMPUS] [SUPERDIMENSION INC]

## (undated) DEVICE — SET EXTN L6IN W/ S STL CLMP

## (undated) DEVICE — KENDALL SCD EXPRESS SLEEVES, KNEE LENGTH, MEDIUM: Brand: KENDALL SCD

## (undated) DEVICE — SINGLE USE SUCTION VALVE MAJ-209: Brand: SINGLE USE SUCTION VALVE (STERILE)

## (undated) DEVICE — BASIC SINGLE BASIN BTC-LF: Brand: MEDLINE INDUSTRIES, INC.

## (undated) DEVICE — BRUSH CYTO L115CM DIA1.9MM 3 NDL PULM USE SUPERDIMENSION

## (undated) DEVICE — ADAPTER BRONCHSCP FOR USE W/ OLY EDGE

## (undated) DEVICE — SOL INJ SOD CL0.9% 10ML PREFIL --

## (undated) DEVICE — SUTURE VCRL SZ 2-0 L36IN ABSRB UD L36MM CT-1 1/2 CIR J945H

## (undated) DEVICE — SINGLE USE ASPIRATION NEEDLE: Brand: SINGLE USE ASPIRATION NEEDLE

## (undated) DEVICE — TRAY PREP DRY W/ PREM GLV 2 APPL 6 SPNG 2 UNDPD 1 OVERWRAP

## (undated) DEVICE — KIT PROC W/ 90DEG FIRM TIP EXT WRK CHN LOCATABLE GUID FOR

## (undated) DEVICE — Device: Brand: BALLOON

## (undated) DEVICE — Y-TYPE TUR/BLADDER IRRIGATION SET, REGULATING CLAMP

## (undated) DEVICE — SYR IRR CATH TIP LR ADPT 70ML -- CONVERT TO ITEM 363120

## (undated) DEVICE — KENDALL RADIOLUCENT FOAM MONITORING ELECTRODE RECTANGULAR SHAPE: Brand: KENDALL

## (undated) DEVICE — BAG DRNGE 4000ML CONT IRRIG ROUNDED TEARDROP SHP DISP

## (undated) DEVICE — HF-RESECTION ELECTRODE PLASMALOOP LOOP, MEDIUM, 24 FR., 12°-30°, PK TURIS: Brand: OLYMPUS

## (undated) DEVICE — PACK PROCEDURE SURG TRANSURETHRAL RESECT OF PROST CDS

## (undated) DEVICE — SINGLE USE BIOPSY VALVE MAJ-210: Brand: SINGLE USE BIOPSY VALVE (STERILE)

## (undated) DEVICE — CONTAINER SPEC 4 OZ CAP SEAL POLYPR TRNSLUC BLU STRL

## (undated) DEVICE — NEEDLE ASPIR L130MM OD21GA 2MM WRK CHN FOR BX

## (undated) DEVICE — BRONCHOSCOPY PACK: Brand: MEDLINE INDUSTRIES, INC.

## (undated) DEVICE — SOLUTION IV 1000ML 0.9% SOD CHL

## (undated) DEVICE — GOWN,PREVENTION PLUS,2XL,ST,22/CS: Brand: MEDLINE

## (undated) DEVICE — SOLUTION IRRIG 3000ML 0.9% SOD CHL FLX CONT 0797208] ICU MEDICAL INC]

## (undated) DEVICE — (D)SYR 10ML 1/5ML GRAD NSAF -- PKGING CHANGE USE ITEM 338027

## (undated) DEVICE — BRUSH CYTO L120MM DIA1.7MM SHARPENED NDL TIP FWD FACING

## (undated) DEVICE — SUT CHRMC 4-0 27IN RB1 BRN --

## (undated) DEVICE — DRAPE TWL SURG 16X26IN BLU ORB04] ALLCARE INC]